# Patient Record
Sex: MALE | Race: BLACK OR AFRICAN AMERICAN | NOT HISPANIC OR LATINO | Employment: UNEMPLOYED | RURAL
[De-identification: names, ages, dates, MRNs, and addresses within clinical notes are randomized per-mention and may not be internally consistent; named-entity substitution may affect disease eponyms.]

---

## 2021-09-19 ENCOUNTER — HOSPITAL ENCOUNTER (EMERGENCY)
Facility: HOSPITAL | Age: 51
Discharge: HOME | End: 2021-09-19
Attending: SURGERY
Payer: OTHER GOVERNMENT

## 2021-09-19 VITALS
BODY MASS INDEX: 33.28 KG/M2 | TEMPERATURE: 100 F | WEIGHT: 224.69 LBS | RESPIRATION RATE: 20 BRPM | HEART RATE: 66 BPM | HEIGHT: 69 IN | SYSTOLIC BLOOD PRESSURE: 181 MMHG | OXYGEN SATURATION: 97 % | DIASTOLIC BLOOD PRESSURE: 95 MMHG

## 2021-09-19 DIAGNOSIS — J02.0 STREP PHARYNGITIS: Primary | ICD-10-CM

## 2021-09-19 DIAGNOSIS — U07.1 COVID-19: ICD-10-CM

## 2021-09-19 LAB
FLUAV AG UPPER RESP QL IA.RAPID: NEGATIVE
FLUBV AG UPPER RESP QL IA.RAPID: NEGATIVE
RAPID GROUP A STREP: POSITIVE
SARS-COV+SARS-COV-2 AG RESP QL IA.RAPID: POSITIVE

## 2021-09-19 PROCEDURE — 63600175 PHARM REV CODE 636 W HCPCS: Performed by: SURGERY

## 2021-09-19 PROCEDURE — 96365 THER/PROPH/DIAG IV INF INIT: CPT

## 2021-09-19 PROCEDURE — 99283 EMERGENCY DEPT VISIT LOW MDM: CPT | Mod: ,,, | Performed by: SURGERY

## 2021-09-19 PROCEDURE — 87428 SARSCOV & INF VIR A&B AG IA: CPT | Performed by: SURGERY

## 2021-09-19 PROCEDURE — 87880 STREP A ASSAY W/OPTIC: CPT | Performed by: SURGERY

## 2021-09-19 PROCEDURE — 99284 EMERGENCY DEPT VISIT MOD MDM: CPT | Mod: 25

## 2021-09-19 PROCEDURE — 25000003 PHARM REV CODE 250: Performed by: SURGERY

## 2021-09-19 PROCEDURE — M0243 CASIRIVI AND IMDEVI INFUSION: HCPCS | Performed by: SURGERY

## 2021-09-19 PROCEDURE — 99283 PR EMERGENCY DEPT VISIT,LEVEL III: ICD-10-PCS | Mod: ,,, | Performed by: SURGERY

## 2021-09-19 RX ORDER — ONDANSETRON 4 MG/1
4 TABLET, ORALLY DISINTEGRATING ORAL ONCE AS NEEDED
Status: DISCONTINUED | OUTPATIENT
Start: 2021-09-19 | End: 2021-09-19 | Stop reason: HOSPADM

## 2021-09-19 RX ORDER — DIPHENHYDRAMINE HYDROCHLORIDE 50 MG/ML
25 INJECTION INTRAMUSCULAR; INTRAVENOUS ONCE AS NEEDED
Status: DISCONTINUED | OUTPATIENT
Start: 2021-09-19 | End: 2021-09-19 | Stop reason: HOSPADM

## 2021-09-19 RX ORDER — CLONIDINE HYDROCHLORIDE 0.1 MG/1
0.2 TABLET ORAL
Status: COMPLETED | OUTPATIENT
Start: 2021-09-19 | End: 2021-09-19

## 2021-09-19 RX ORDER — SODIUM CHLORIDE 0.9 % (FLUSH) 0.9 %
10 SYRINGE (ML) INJECTION
Status: DISCONTINUED | OUTPATIENT
Start: 2021-09-19 | End: 2021-09-19 | Stop reason: HOSPADM

## 2021-09-19 RX ORDER — ALBUTEROL SULFATE 90 UG/1
2 AEROSOL, METERED RESPIRATORY (INHALATION)
Status: DISCONTINUED | OUTPATIENT
Start: 2021-09-19 | End: 2021-09-19 | Stop reason: HOSPADM

## 2021-09-19 RX ORDER — ACETAMINOPHEN 325 MG/1
650 TABLET ORAL ONCE AS NEEDED
Status: DISCONTINUED | OUTPATIENT
Start: 2021-09-19 | End: 2021-09-19 | Stop reason: HOSPADM

## 2021-09-19 RX ORDER — AZITHROMYCIN 250 MG/1
250 TABLET, FILM COATED ORAL DAILY
Qty: 6 TABLET | Refills: 0 | Status: SHIPPED | OUTPATIENT
Start: 2021-09-19 | End: 2023-02-02

## 2021-09-19 RX ORDER — EPINEPHRINE 0.3 MG/.3ML
0.3 INJECTION SUBCUTANEOUS
Status: DISCONTINUED | OUTPATIENT
Start: 2021-09-19 | End: 2021-09-19 | Stop reason: HOSPADM

## 2021-09-19 RX ADMIN — DEXTROSE MONOHYDRATE 500 MG: 50 INJECTION, SOLUTION INTRAVENOUS at 09:09

## 2021-09-19 RX ADMIN — CASIRIVIMAB AND IMDEVIMAB 600 MG: 600; 600 INJECTION, SOLUTION, CONCENTRATE INTRAVENOUS at 09:09

## 2021-09-19 RX ADMIN — CLONIDINE HYDROCHLORIDE 0.2 MG: 0.1 TABLET ORAL at 07:09

## 2021-09-21 ENCOUNTER — TELEPHONE (OUTPATIENT)
Dept: EMERGENCY MEDICINE | Facility: HOSPITAL | Age: 51
End: 2021-09-21

## 2021-11-16 ENCOUNTER — OFFICE VISIT (OUTPATIENT)
Dept: PRIMARY CARE CLINIC | Facility: CLINIC | Age: 51
End: 2021-11-16
Payer: COMMERCIAL

## 2021-11-16 VITALS
HEART RATE: 71 BPM | RESPIRATION RATE: 20 BRPM | SYSTOLIC BLOOD PRESSURE: 148 MMHG | OXYGEN SATURATION: 98 % | HEIGHT: 69 IN | TEMPERATURE: 98 F | WEIGHT: 236 LBS | BODY MASS INDEX: 34.96 KG/M2 | DIASTOLIC BLOOD PRESSURE: 90 MMHG

## 2021-11-16 DIAGNOSIS — Z12.5 ENCOUNTER FOR SCREENING FOR MALIGNANT NEOPLASM OF PROSTATE: ICD-10-CM

## 2021-11-16 DIAGNOSIS — R53.83 FATIGUE, UNSPECIFIED TYPE: ICD-10-CM

## 2021-11-16 DIAGNOSIS — E78.5 HYPERLIPIDEMIA, UNSPECIFIED HYPERLIPIDEMIA TYPE: ICD-10-CM

## 2021-11-16 DIAGNOSIS — R68.82 DECREASED LIBIDO: ICD-10-CM

## 2021-11-16 DIAGNOSIS — I10 HYPERTENSION, UNSPECIFIED TYPE: Primary | ICD-10-CM

## 2021-11-16 LAB
ALBUMIN SERPL BCP-MCNC: 3.4 G/DL (ref 3.5–5)
ALBUMIN/GLOB SERPL: 0.9 {RATIO}
ALP SERPL-CCNC: 58 U/L (ref 45–115)
ALT SERPL W P-5'-P-CCNC: 27 U/L (ref 16–61)
ANION GAP SERPL CALCULATED.3IONS-SCNC: 12 MMOL/L (ref 7–16)
AST SERPL W P-5'-P-CCNC: 16 U/L (ref 15–37)
BASOPHILS # BLD AUTO: 0.02 K/UL (ref 0–0.2)
BASOPHILS NFR BLD AUTO: 0.4 % (ref 0–1)
BILIRUB SERPL-MCNC: 0.2 MG/DL (ref 0–1.2)
BUN SERPL-MCNC: 22 MG/DL (ref 7–18)
BUN/CREAT SERPL: 17 (ref 6–20)
CALCIUM SERPL-MCNC: 9.5 MG/DL (ref 8.5–10.1)
CHLORIDE SERPL-SCNC: 109 MMOL/L (ref 98–107)
CHOLEST SERPL-MCNC: 195 MG/DL (ref 0–200)
CHOLEST/HDLC SERPL: 5.7 {RATIO}
CO2 SERPL-SCNC: 25 MMOL/L (ref 21–32)
CREAT SERPL-MCNC: 1.3 MG/DL (ref 0.7–1.3)
DIFFERENTIAL METHOD BLD: ABNORMAL
EOSINOPHIL # BLD AUTO: 0.1 K/UL (ref 0–0.5)
EOSINOPHIL NFR BLD AUTO: 2 % (ref 1–4)
ERYTHROCYTE [DISTWIDTH] IN BLOOD BY AUTOMATED COUNT: 12.2 % (ref 11.5–14.5)
GLOBULIN SER-MCNC: 3.8 G/DL (ref 2–4)
GLUCOSE SERPL-MCNC: 93 MG/DL (ref 74–106)
HCT VFR BLD AUTO: 40.5 % (ref 40–54)
HDLC SERPL-MCNC: 34 MG/DL (ref 40–60)
HGB BLD-MCNC: 13.4 G/DL (ref 13.5–18)
IMM GRANULOCYTES # BLD AUTO: 0.01 K/UL (ref 0–0.04)
IMM GRANULOCYTES NFR BLD: 0.2 % (ref 0–0.4)
LDLC SERPL CALC-MCNC: 135 MG/DL
LDLC/HDLC SERPL: 4 {RATIO}
LYMPHOCYTES # BLD AUTO: 1.54 K/UL (ref 1–4.8)
LYMPHOCYTES NFR BLD AUTO: 31.1 % (ref 27–41)
MCH RBC QN AUTO: 30.7 PG (ref 27–31)
MCHC RBC AUTO-ENTMCNC: 33.1 G/DL (ref 32–36)
MCV RBC AUTO: 92.7 FL (ref 80–96)
MONOCYTES # BLD AUTO: 0.42 K/UL (ref 0–0.8)
MONOCYTES NFR BLD AUTO: 8.5 % (ref 2–6)
MPC BLD CALC-MCNC: 9.2 FL (ref 9.4–12.4)
NEUTROPHILS # BLD AUTO: 2.86 K/UL (ref 1.8–7.7)
NEUTROPHILS NFR BLD AUTO: 57.8 % (ref 53–65)
NONHDLC SERPL-MCNC: 161 MG/DL
NRBC # BLD AUTO: 0 X10E3/UL
NRBC, AUTO (.00): 0 %
PLATELET # BLD AUTO: 314 K/UL (ref 150–400)
POTASSIUM SERPL-SCNC: 4 MMOL/L (ref 3.5–5.1)
PROT SERPL-MCNC: 7.2 G/DL (ref 6.4–8.2)
PSA SERPL-MCNC: 0.64 NG/ML (ref 0–3.1)
RBC # BLD AUTO: 4.37 M/UL (ref 4.6–6.2)
SODIUM SERPL-SCNC: 142 MMOL/L (ref 136–145)
TESTOST SERPL-MCNC: 291 NG/DL (ref 240–950)
TRIGL SERPL-MCNC: 128 MG/DL (ref 35–150)
VLDLC SERPL-MCNC: 26 MG/DL
WBC # BLD AUTO: 4.95 K/UL (ref 4.5–11)

## 2021-11-16 PROCEDURE — 84403 ASSAY OF TOTAL TESTOSTERONE: CPT | Mod: ,,, | Performed by: CLINICAL MEDICAL LABORATORY

## 2021-11-16 PROCEDURE — 85025 COMPLETE CBC W/AUTO DIFF WBC: CPT | Mod: ,,, | Performed by: CLINICAL MEDICAL LABORATORY

## 2021-11-16 PROCEDURE — 80061 LIPID PANEL: ICD-10-PCS | Mod: ,,, | Performed by: CLINICAL MEDICAL LABORATORY

## 2021-11-16 PROCEDURE — G0103 PSA, SCREENING: ICD-10-PCS | Mod: ,,, | Performed by: CLINICAL MEDICAL LABORATORY

## 2021-11-16 PROCEDURE — 99214 OFFICE O/P EST MOD 30 MIN: CPT | Mod: ,,, | Performed by: FAMILY MEDICINE

## 2021-11-16 PROCEDURE — 80061 LIPID PANEL: CPT | Mod: ,,, | Performed by: CLINICAL MEDICAL LABORATORY

## 2021-11-16 PROCEDURE — 99214 PR OFFICE/OUTPT VISIT, EST, LEVL IV, 30-39 MIN: ICD-10-PCS | Mod: ,,, | Performed by: FAMILY MEDICINE

## 2021-11-16 PROCEDURE — 85025 CBC WITH DIFFERENTIAL: ICD-10-PCS | Mod: ,,, | Performed by: CLINICAL MEDICAL LABORATORY

## 2021-11-16 PROCEDURE — 80053 COMPREHEN METABOLIC PANEL: CPT | Mod: ,,, | Performed by: CLINICAL MEDICAL LABORATORY

## 2021-11-16 PROCEDURE — G0103 PSA SCREENING: HCPCS | Mod: ,,, | Performed by: CLINICAL MEDICAL LABORATORY

## 2021-11-16 PROCEDURE — 80053 COMPREHENSIVE METABOLIC PANEL: ICD-10-PCS | Mod: ,,, | Performed by: CLINICAL MEDICAL LABORATORY

## 2021-11-16 PROCEDURE — 84403 TESTOSTERONE: ICD-10-PCS | Mod: ,,, | Performed by: CLINICAL MEDICAL LABORATORY

## 2021-11-16 RX ORDER — AMLODIPINE BESYLATE 10 MG/1
10 TABLET ORAL DAILY
COMMUNITY
End: 2021-11-16 | Stop reason: SDUPTHER

## 2021-11-16 RX ORDER — AMLODIPINE BESYLATE 10 MG/1
10 TABLET ORAL DAILY
Qty: 90 TABLET | Refills: 3 | Status: SHIPPED | OUTPATIENT
Start: 2021-11-16 | End: 2023-02-02 | Stop reason: SDUPTHER

## 2021-11-17 ENCOUNTER — TELEPHONE (OUTPATIENT)
Dept: PRIMARY CARE CLINIC | Facility: CLINIC | Age: 51
End: 2021-11-17
Payer: COMMERCIAL

## 2021-11-30 ENCOUNTER — OFFICE VISIT (OUTPATIENT)
Dept: PRIMARY CARE CLINIC | Facility: CLINIC | Age: 51
End: 2021-11-30
Payer: COMMERCIAL

## 2021-11-30 VITALS
BODY MASS INDEX: 34.51 KG/M2 | OXYGEN SATURATION: 98 % | WEIGHT: 233 LBS | TEMPERATURE: 98 F | DIASTOLIC BLOOD PRESSURE: 110 MMHG | HEIGHT: 69 IN | HEART RATE: 78 BPM | SYSTOLIC BLOOD PRESSURE: 160 MMHG

## 2021-11-30 DIAGNOSIS — I10 PRIMARY HYPERTENSION: Primary | ICD-10-CM

## 2021-11-30 PROCEDURE — 99213 OFFICE O/P EST LOW 20 MIN: CPT | Mod: ,,, | Performed by: FAMILY MEDICINE

## 2021-11-30 PROCEDURE — 99213 PR OFFICE/OUTPT VISIT, EST, LEVL III, 20-29 MIN: ICD-10-PCS | Mod: ,,, | Performed by: FAMILY MEDICINE

## 2021-11-30 RX ORDER — BENAZEPRIL HYDROCHLORIDE AND HYDROCHLOROTHIAZIDE 20; 12.5 MG/1; MG/1
1 TABLET ORAL DAILY
Qty: 90 TABLET | Refills: 3 | Status: SHIPPED | OUTPATIENT
Start: 2021-11-30 | End: 2023-02-02 | Stop reason: SDUPTHER

## 2022-04-25 ENCOUNTER — HOSPITAL ENCOUNTER (EMERGENCY)
Facility: HOSPITAL | Age: 52
Discharge: HOME | End: 2022-04-25
Attending: FAMILY MEDICINE
Payer: COMMERCIAL

## 2022-04-25 VITALS
HEART RATE: 84 BPM | DIASTOLIC BLOOD PRESSURE: 93 MMHG | OXYGEN SATURATION: 94 % | WEIGHT: 226.5 LBS | BODY MASS INDEX: 33.55 KG/M2 | SYSTOLIC BLOOD PRESSURE: 166 MMHG | HEIGHT: 69 IN | RESPIRATION RATE: 24 BRPM | TEMPERATURE: 102 F

## 2022-04-25 DIAGNOSIS — R50.9 FEVER: ICD-10-CM

## 2022-04-25 DIAGNOSIS — E78.5 HYPERLIPIDEMIA: ICD-10-CM

## 2022-04-25 DIAGNOSIS — J06.9 VIRAL URI WITH COUGH: Primary | ICD-10-CM

## 2022-04-25 DIAGNOSIS — R53.83 FATIGUE: ICD-10-CM

## 2022-04-25 DIAGNOSIS — Z12.5 ENCOUNTER FOR SCREENING FOR MALIGNANT NEOPLASM OF PROSTATE: ICD-10-CM

## 2022-04-25 DIAGNOSIS — J10.1 INFLUENZA A: ICD-10-CM

## 2022-04-25 DIAGNOSIS — R31.9 HEMATURIA, UNSPECIFIED TYPE: ICD-10-CM

## 2022-04-25 DIAGNOSIS — I10 PRIMARY HYPERTENSION: ICD-10-CM

## 2022-04-25 DIAGNOSIS — R68.82 DECREASED LIBIDO: ICD-10-CM

## 2022-04-25 DIAGNOSIS — R05.9 COUGH IN ADULT: ICD-10-CM

## 2022-04-25 LAB
ALBUMIN SERPL BCP-MCNC: 3.7 G/DL (ref 3.5–5)
ALBUMIN/GLOB SERPL: 0.9 {RATIO}
ALP SERPL-CCNC: 53 U/L (ref 45–115)
ALT SERPL W P-5'-P-CCNC: 30 U/L (ref 16–61)
ANION GAP SERPL CALCULATED.3IONS-SCNC: 9 MMOL/L (ref 7–16)
AST SERPL W P-5'-P-CCNC: 23 U/L (ref 15–37)
BACTERIA #/AREA URNS HPF: ABNORMAL /HPF
BASOPHILS # BLD AUTO: 0.01 K/UL (ref 0–0.2)
BASOPHILS NFR BLD AUTO: 0.2 % (ref 0–1)
BILIRUB SERPL-MCNC: 0.4 MG/DL (ref 0–1.2)
BILIRUB UR QL STRIP: NEGATIVE
BUN SERPL-MCNC: 15 MG/DL (ref 7–18)
BUN/CREAT SERPL: 9 (ref 6–20)
CALCIUM SERPL-MCNC: 9.5 MG/DL (ref 8.5–10.1)
CHLORIDE SERPL-SCNC: 106 MMOL/L (ref 98–107)
CLARITY UR: CLEAR
CO2 SERPL-SCNC: 27 MMOL/L (ref 21–32)
COLOR UR: YELLOW
CREAT SERPL-MCNC: 1.6 MG/DL (ref 0.7–1.3)
DIFFERENTIAL METHOD BLD: ABNORMAL
EOSINOPHIL # BLD AUTO: 0.05 K/UL (ref 0–0.5)
EOSINOPHIL NFR BLD AUTO: 1.1 % (ref 1–4)
ERYTHROCYTE [DISTWIDTH] IN BLOOD BY AUTOMATED COUNT: 12.4 % (ref 11.5–14.5)
FLUAV AG UPPER RESP QL IA.RAPID: POSITIVE
FLUBV AG UPPER RESP QL IA.RAPID: NEGATIVE
GLOBULIN SER-MCNC: 3.9 G/DL (ref 2–4)
GLUCOSE SERPL-MCNC: 110 MG/DL (ref 74–106)
GLUCOSE UR STRIP-MCNC: NEGATIVE MG/DL
HCT VFR BLD AUTO: 40.3 % (ref 40–54)
HGB BLD-MCNC: 13.2 G/DL (ref 13.5–18)
IMM GRANULOCYTES # BLD AUTO: 0 K/UL (ref 0–0.04)
IMM GRANULOCYTES NFR BLD: 0 % (ref 0–0.4)
KETONES UR STRIP-SCNC: NEGATIVE MG/DL
LACTATE SERPL-SCNC: 0.7 MMOL/L (ref 0.4–2)
LEUKOCYTE ESTERASE UR QL STRIP: NEGATIVE
LIPASE SERPL-CCNC: 60 U/L (ref 73–393)
LYMPHOCYTES # BLD AUTO: 0.46 K/UL (ref 1–4.8)
LYMPHOCYTES NFR BLD AUTO: 10.1 % (ref 27–41)
MCH RBC QN AUTO: 30.6 PG (ref 27–31)
MCHC RBC AUTO-ENTMCNC: 32.8 G/DL (ref 32–36)
MCV RBC AUTO: 93.5 FL (ref 80–96)
MONOCYTES # BLD AUTO: 0.32 K/UL (ref 0–0.8)
MONOCYTES NFR BLD AUTO: 7 % (ref 2–6)
MPC BLD CALC-MCNC: 8.7 FL (ref 9.4–12.4)
NEUTROPHILS # BLD AUTO: 3.73 K/UL (ref 1.8–7.7)
NEUTROPHILS NFR BLD AUTO: 81.6 % (ref 53–65)
NITRITE UR QL STRIP: NEGATIVE
PH UR STRIP: 6 PH UNITS
PLATELET # BLD AUTO: 211 K/UL (ref 150–400)
POTASSIUM SERPL-SCNC: 3.6 MMOL/L (ref 3.5–5.1)
PROT SERPL-MCNC: 7.6 G/DL (ref 6.4–8.2)
PROT UR QL STRIP: 30
RBC # BLD AUTO: 4.31 M/UL (ref 4.6–6.2)
RBC # UR STRIP: ABNORMAL /UL
RBC #/AREA URNS HPF: ABNORMAL /HPF
SARS-COV+SARS-COV-2 AG RESP QL IA.RAPID: NEGATIVE
SODIUM SERPL-SCNC: 138 MMOL/L (ref 136–145)
SP GR UR STRIP: 1.02
SQUAMOUS #/AREA URNS LPF: ABNORMAL /LPF
UROBILINOGEN UR STRIP-ACNC: 1 MG/DL
WBC # BLD AUTO: 4.57 K/UL (ref 4.5–11)
WBC #/AREA URNS HPF: ABNORMAL /HPF

## 2022-04-25 PROCEDURE — 80053 COMPREHEN METABOLIC PANEL: CPT | Performed by: FAMILY MEDICINE

## 2022-04-25 PROCEDURE — 83605 ASSAY OF LACTIC ACID: CPT | Performed by: FAMILY MEDICINE

## 2022-04-25 PROCEDURE — 83690 ASSAY OF LIPASE: CPT | Performed by: FAMILY MEDICINE

## 2022-04-25 PROCEDURE — 36415 COLL VENOUS BLD VENIPUNCTURE: CPT | Performed by: FAMILY MEDICINE

## 2022-04-25 PROCEDURE — 99284 EMERGENCY DEPT VISIT MOD MDM: CPT | Mod: 25 | Performed by: FAMILY MEDICINE

## 2022-04-25 PROCEDURE — 25000003 PHARM REV CODE 250: Performed by: FAMILY MEDICINE

## 2022-04-25 PROCEDURE — 85025 COMPLETE CBC W/AUTO DIFF WBC: CPT | Performed by: FAMILY MEDICINE

## 2022-04-25 PROCEDURE — 99283 PR EMERGENCY DEPT VISIT,LEVEL III: ICD-10-PCS | Mod: ,,, | Performed by: FAMILY MEDICINE

## 2022-04-25 PROCEDURE — 99283 EMERGENCY DEPT VISIT LOW MDM: CPT | Mod: ,,, | Performed by: FAMILY MEDICINE

## 2022-04-25 PROCEDURE — 81001 URINALYSIS AUTO W/SCOPE: CPT | Performed by: FAMILY MEDICINE

## 2022-04-25 PROCEDURE — 87428 SARSCOV & INF VIR A&B AG IA: CPT | Performed by: FAMILY MEDICINE

## 2022-04-25 RX ORDER — ACETAMINOPHEN 500 MG
1000 TABLET ORAL
Status: COMPLETED | OUTPATIENT
Start: 2022-04-25 | End: 2022-04-25

## 2022-04-25 RX ORDER — CLONIDINE HYDROCHLORIDE 0.1 MG/1
0.2 TABLET ORAL
Status: COMPLETED | OUTPATIENT
Start: 2022-04-25 | End: 2022-04-25

## 2022-04-25 RX ORDER — OSELTAMIVIR PHOSPHATE 75 MG/1
75 CAPSULE ORAL 2 TIMES DAILY
Qty: 10 CAPSULE | Refills: 0 | Status: SHIPPED | OUTPATIENT
Start: 2022-04-25 | End: 2022-04-30

## 2022-04-25 RX ADMIN — ACETAMINOPHEN 1000 MG: 500 TABLET, FILM COATED ORAL at 08:04

## 2022-04-25 RX ADMIN — CLONIDINE HYDROCHLORIDE 0.2 MG: 0.1 TABLET ORAL at 08:04

## 2022-04-25 NOTE — DISCHARGE INSTRUCTIONS
"Vitamin D3, 5000 IU capsules.  Take 3 with every meal for 3 days ONLY.  (Loading dose.)  Then decrease to 1 to 3 capsules total, ONE time daily, also with a meal, for maintenance.  Have your PCP check for your Vitamin D, 25-Hydroxy level on the blood test.  It needs to be between  for best health benefits, including resistance to all strains of cold and flu, including COVID.  If your blood level is below 60, take a little more, and if it is above 100, take a little less.    Vitamin C, 500mg tabs.  Take 1-2 tabs with every meal.  If this bothers your stomach, switch to "Kristi-C" which isn't as acidic.  Plenty of rest, fluids.  "

## 2022-04-25 NOTE — ED PROVIDER NOTES
Encounter Date: 4/25/2022       History     Chief Complaint   Patient presents with    Cough    Fever     Pt reports 2 days of cough, congestion and fever to 102 F.  Says he is drinking lots of water.          Review of patient's allergies indicates:  No Known Allergies  Past Medical History:   Diagnosis Date    Hypertension      History reviewed. No pertinent surgical history.  Family History   Problem Relation Age of Onset    Hypertension Mother     Diabetes Mother     Cancer Father      Social History     Tobacco Use    Smoking status: Never Smoker    Smokeless tobacco: Never Used   Substance Use Topics    Alcohol use: Yes     Comment: socially    Drug use: Never     Review of Systems   HENT: Positive for congestion and rhinorrhea.    Respiratory: Positive for cough. Negative for chest tightness, shortness of breath, wheezing and stridor.    Cardiovascular: Negative for chest pain, palpitations and leg swelling.   Gastrointestinal: Negative for abdominal pain, diarrhea, nausea and vomiting.   All other systems reviewed and are negative.      Physical Exam     Initial Vitals [04/25/22 0741]   BP Pulse Resp Temp SpO2   (!) 238/116 110 (!) 28 (!) 102.9 °F (39.4 °C) 97 %      MAP       --         Physical Exam    Nursing note and vitals reviewed.  Constitutional: He appears well-developed and well-nourished.   HENT:   Head: Normocephalic and atraumatic.   Eyes: EOM are normal. Pupils are equal, round, and reactive to light.   Neck: Neck supple. No tracheal deviation present. No JVD present.   Cardiovascular: Normal rate, regular rhythm, normal heart sounds and intact distal pulses. Exam reveals no gallop and no friction rub.    No murmur heard.  Pulmonary/Chest: Breath sounds normal. No stridor. No respiratory distress. He has no wheezes. He has no rhonchi. He has no rales.   Abdominal: Abdomen is soft. Bowel sounds are normal. He exhibits no distension. There is no abdominal tenderness. There is no rebound  and no guarding.   Musculoskeletal:         General: No tenderness or edema. Normal range of motion.      Cervical back: Neck supple.     Lymphadenopathy:     He has no cervical adenopathy.   Neurological: He is alert and oriented to person, place, and time.   Skin: Skin is warm and dry. Capillary refill takes less than 2 seconds.   Psychiatric: He has a normal mood and affect.         Medical Screening Exam   See Full Note    ED Course   Procedures  Labs Reviewed   COMPREHENSIVE METABOLIC PANEL - Abnormal; Notable for the following components:       Result Value    Glucose 110 (*)     Creatinine 1.60 (*)     eGFR 49 (*)     All other components within normal limits   LIPASE - Abnormal; Notable for the following components:    Lipase 60 (*)     All other components within normal limits   URINALYSIS, REFLEX TO URINE CULTURE - Abnormal; Notable for the following components:    Protein, UA 30  (*)     Blood, UA Moderate (*)     All other components within normal limits   CBC WITH DIFFERENTIAL - Abnormal; Notable for the following components:    RBC 4.31 (*)     Hemoglobin 13.2 (*)     MPV 8.7 (*)     Neutrophils % 81.6 (*)     Lymphocytes % 10.1 (*)     Lymphocytes, Absolute 0.46 (*)     Monocytes % 7.0 (*)     All other components within normal limits   SARS-COV2 (COVID) W/ FLU ANTIGEN - Abnormal; Notable for the following components:    Influenza A Positive (*)     All other components within normal limits    Narrative:     Negative SARS-CoV results should not be used as the sole basis for treatment or patient management decisions; negative results should be considered in the context of a patient's recent exposures, history and the presene of clinical signs and symptoms consistent with COVID-19.  Negative results should be treated as presumptive and confirmed by molecular assay, if necessary for patient management.   URINALYSIS, MICROSCOPIC - Abnormal; Notable for the following components:    RBC, UA 15-25 (*)      All other components within normal limits   LACTIC ACID, PLASMA - Normal   CBC W/ AUTO DIFFERENTIAL    Narrative:     The following orders were created for panel order CBC W/ AUTO DIFFERENTIAL.  Procedure                               Abnormality         Status                     ---------                               -----------         ------                     CBC with Differential[[id number]]        Abnormal            Final result                 Please view results for these tests on the individual orders.          Imaging Results          X-Ray Chest PA And Lateral (Final result)  Result time 04/25/22 08:17:43    Final result by Crystal Gong MD (04/25/22 08:17:43)                 Impression:      Minimal infiltrates/atelectasis in the left lung base      Electronically signed by: Crystal Gong  Date:    04/25/2022  Time:    08:17             Narrative:    EXAMINATION:  XR CHEST PA AND LATERAL    CLINICAL HISTORY:  Cough, unspecified    FINDINGS:  Comparison 07/28/2016    Mediastinal contours unchanged accounting for rotation    There is minimal linear opacity and vascular crowding in the retrocardiac left base.  Right lung is clear.                                 Medications   cloNIDine tablet 0.2 mg (0.2 mg Oral Given 4/25/22 0802)   acetaminophen tablet 1,000 mg (1,000 mg Oral Given 4/25/22 0801)                       Clinical Impression:   Final diagnoses:  [R50.9] Fever  [R05.9] Cough in adult  [J06.9] Viral URI with cough (Primary)  [J10.1] Influenza A  [R31.9] Hematuria, unspecified type          ED Disposition Condition    Discharge Stable        ED Prescriptions     Medication Sig Dispense Start Date End Date Auth. Provider    oseltamivir (TAMIFLU) 75 MG capsule Take 1 capsule (75 mg total) by mouth 2 (two) times daily. for 5 days 10 capsule 4/25/2022 4/30/2022 Maikol Munguia MD        Follow-up Information    None          Maikol Munguia MD  04/25/22 0805       Maikol Munguia MD  04/25/22  [id number]

## 2022-04-25 NOTE — Clinical Note
"Zach"Brodie Briscoe was seen and treated in our emergency department on 4/25/2022.  He may return to work on 04/28/2022.  MAY RETURN TO WORK IF FEVER FREE FOR 24 HOURS       If you have any questions or concerns, please don't hesitate to call.      DR. BAÑUELOS/ALICIA GRAFF RN    "

## 2022-04-25 NOTE — ED TRIAGE NOTES
Headache, cough, nasal congestion, and  Body aches that started yesterday. States coworkers were sick last week. Temp 102.9 pt hasnt taken any meds for fever. bp elevated. Pt states that he has not taken his bp meds this morning.

## 2022-04-26 ENCOUNTER — TELEPHONE (OUTPATIENT)
Dept: EMERGENCY MEDICINE | Facility: HOSPITAL | Age: 52
End: 2022-04-26
Payer: COMMERCIAL

## 2022-04-28 ENCOUNTER — OFFICE VISIT (OUTPATIENT)
Dept: PRIMARY CARE CLINIC | Facility: CLINIC | Age: 52
End: 2022-04-28
Payer: COMMERCIAL

## 2022-04-28 VITALS
BODY MASS INDEX: 32.29 KG/M2 | WEIGHT: 218 LBS | HEIGHT: 69 IN | TEMPERATURE: 98 F | SYSTOLIC BLOOD PRESSURE: 144 MMHG | OXYGEN SATURATION: 98 % | DIASTOLIC BLOOD PRESSURE: 90 MMHG | HEART RATE: 73 BPM

## 2022-04-28 DIAGNOSIS — H61.23 BILATERAL IMPACTED CERUMEN: ICD-10-CM

## 2022-04-28 DIAGNOSIS — R51.9 ACUTE NONINTRACTABLE HEADACHE, UNSPECIFIED HEADACHE TYPE: ICD-10-CM

## 2022-04-28 DIAGNOSIS — I10 PRIMARY HYPERTENSION: Primary | ICD-10-CM

## 2022-04-28 PROCEDURE — 96372 PR INJECTION,THERAP/PROPH/DIAG2ST, IM OR SUBCUT: ICD-10-PCS | Mod: 59,,, | Performed by: FAMILY MEDICINE

## 2022-04-28 PROCEDURE — 99213 OFFICE O/P EST LOW 20 MIN: CPT | Mod: 25,,, | Performed by: FAMILY MEDICINE

## 2022-04-28 PROCEDURE — 69209 REMOVE IMPACTED EAR WAX UNI: CPT | Mod: 50,,, | Performed by: FAMILY MEDICINE

## 2022-04-28 PROCEDURE — 69209 PR REMOVAL IMPACTED CERUMEN USING IRRIGATION/LAVAGE, UNILATERAL: ICD-10-PCS | Mod: 50,,, | Performed by: FAMILY MEDICINE

## 2022-04-28 PROCEDURE — 96372 THER/PROPH/DIAG INJ SC/IM: CPT | Mod: 59,,, | Performed by: FAMILY MEDICINE

## 2022-04-28 PROCEDURE — 99213 PR OFFICE/OUTPT VISIT, EST, LEVL III, 20-29 MIN: ICD-10-PCS | Mod: 25,,, | Performed by: FAMILY MEDICINE

## 2022-04-28 RX ORDER — BETAMETHASONE SODIUM PHOSPHATE AND BETAMETHASONE ACETATE 3; 3 MG/ML; MG/ML
6 INJECTION, SUSPENSION INTRA-ARTICULAR; INTRALESIONAL; INTRAMUSCULAR; SOFT TISSUE
Status: COMPLETED | OUTPATIENT
Start: 2022-04-28 | End: 2022-04-28

## 2022-04-28 RX ORDER — KETOROLAC TROMETHAMINE 30 MG/ML
60 INJECTION, SOLUTION INTRAMUSCULAR; INTRAVENOUS
Status: COMPLETED | OUTPATIENT
Start: 2022-04-28 | End: 2022-04-28

## 2022-04-28 RX ORDER — NEOMYCIN SULFATE, POLYMYXIN B SULFATE AND HYDROCORTISONE 10; 3.5; 1 MG/ML; MG/ML; [USP'U]/ML
3 SUSPENSION/ DROPS AURICULAR (OTIC) 3 TIMES DAILY
Qty: 10 ML | Refills: 0 | Status: SHIPPED | OUTPATIENT
Start: 2022-04-28 | End: 2023-02-02

## 2022-04-28 RX ADMIN — KETOROLAC TROMETHAMINE 60 MG: 30 INJECTION, SOLUTION INTRAMUSCULAR; INTRAVENOUS at 09:04

## 2022-04-28 RX ADMIN — BETAMETHASONE SODIUM PHOSPHATE AND BETAMETHASONE ACETATE 6 MG: 3; 3 INJECTION, SUSPENSION INTRA-ARTICULAR; INTRALESIONAL; INTRAMUSCULAR; SOFT TISSUE at 09:04

## 2022-04-28 NOTE — LETTER
April 28, 2022      [facility]  [address] [place] [address]  Phone: [phone]  Fax: [phone]       Patient: Zach Briscoe   YOB: 1970  Date of Visit: 04/28/2022    To Whom It May Concern:    Luis F Briscoe  was at [facility] on 04/28/2022. The patient may return to work/school on 05/02/2022 with no restrictions. If you have any questions or concerns, or if I can be of further assistance, please do not hesitate to contact me.    Sincerely,    Deb Scott LPN

## 2022-04-28 NOTE — PROGRESS NOTES
Subjective:       Patient ID: Zach Briscoe is a 51 y.o. male.    Chief Complaint: Headache (Bad headache ongoing since Sunday. Dx. With flu on 4/25 at [facility] ED)      Pt. Has a continuing headache since going to ED. Now he states that he's having numbness and tingling in left arm. He thinks he's had a TIA. He did not have a scan in ED. States that he is taking antihypertensive. He was diagnosed with influenza in the ED.    Headache   Associated symptoms include numbness. Pertinent negatives include no coughing, fever, nausea or vomiting.     Review of Systems   Constitutional: Negative for fatigue and fever.   HENT: Negative for dental problem.    Eyes: Negative for discharge.   Respiratory: Negative for cough, choking, chest tightness and shortness of breath.    Cardiovascular: Negative for chest pain and leg swelling.   Gastrointestinal: Negative for constipation, diarrhea, nausea and vomiting.   Genitourinary: Negative for discharge and flank pain.   Musculoskeletal: Negative for arthralgias and myalgias.   Allergic/Immunologic: Negative for environmental allergies.   Neurological: Positive for numbness and headaches. Negative for memory loss.   Psychiatric/Behavioral: Negative for behavioral problems and hallucinations.         Objective:      Physical Exam  Vitals and nursing note reviewed.   Constitutional:       Appearance: Normal appearance. He is normal weight.   HENT:      Head: Normocephalic and atraumatic.      Right Ear: Tympanic membrane normal. There is impacted cerumen.      Left Ear: Tympanic membrane normal. There is impacted cerumen.      Nose: Nose normal.      Mouth/Throat:      Mouth: Mucous membranes are moist.   Eyes:      Extraocular Movements: Extraocular movements intact.      Conjunctiva/sclera: Conjunctivae normal.      Pupils: Pupils are equal, round, and reactive to light.   Cardiovascular:      Rate and Rhythm: Normal rate and regular rhythm.      Pulses: Normal pulses.    Pulmonary:      Effort: Pulmonary effort is normal.      Breath sounds: Normal breath sounds.   Abdominal:      General: Abdomen is flat. Bowel sounds are normal.      Palpations: Abdomen is soft.   Musculoskeletal:         General: Normal range of motion.      Cervical back: Normal range of motion and neck supple.   Skin:     General: Skin is warm and dry.   Neurological:      General: No focal deficit present.      Mental Status: He is alert and oriented to person, place, and time.      Comments: Pt. Having a tingling of left arm. Motor function is intact.   Psychiatric:         Mood and Affect: Mood normal.         Assessment:       There are no diagnoses linked to this encounter.

## 2022-04-28 NOTE — PROGRESS NOTES
Bilateral ears irrigated with Elephant ear washer. Pt tolerated well. Bilateral ear cannals were red and  Dr ALICIA has been notified. Pt tolerated well. Performed by Deb Scott LPN

## 2022-05-06 ENCOUNTER — HOSPITAL ENCOUNTER (OUTPATIENT)
Dept: RADIOLOGY | Facility: HOSPITAL | Age: 52
Discharge: HOME | End: 2022-05-06
Attending: FAMILY MEDICINE
Payer: COMMERCIAL

## 2022-05-06 ENCOUNTER — TELEPHONE (OUTPATIENT)
Dept: PRIMARY CARE CLINIC | Facility: CLINIC | Age: 52
End: 2022-05-06
Payer: COMMERCIAL

## 2022-05-06 DIAGNOSIS — R51.9 ACUTE NONINTRACTABLE HEADACHE, UNSPECIFIED HEADACHE TYPE: ICD-10-CM

## 2022-05-06 PROCEDURE — 70450 CT HEAD/BRAIN W/O DYE: CPT | Mod: TC

## 2022-05-06 NOTE — TELEPHONE ENCOUNTER
Tried to call pt to let him know that Dr ALICIA did not report anything bad regarding his CT scan. No answer.

## 2022-05-06 NOTE — TELEPHONE ENCOUNTER
----- Message from Lizette Alvarenga MD sent at 5/6/2022 10:08 AM CDT -----  Please give pt. results

## 2023-02-02 ENCOUNTER — OFFICE VISIT (OUTPATIENT)
Dept: PRIMARY CARE CLINIC | Facility: CLINIC | Age: 53
End: 2023-02-02
Payer: COMMERCIAL

## 2023-02-02 VITALS
HEART RATE: 70 BPM | OXYGEN SATURATION: 99 % | DIASTOLIC BLOOD PRESSURE: 90 MMHG | TEMPERATURE: 98 F | HEIGHT: 69 IN | BODY MASS INDEX: 34.07 KG/M2 | WEIGHT: 230 LBS | SYSTOLIC BLOOD PRESSURE: 146 MMHG

## 2023-02-02 DIAGNOSIS — I10 HYPERTENSION, UNSPECIFIED TYPE: ICD-10-CM

## 2023-02-02 DIAGNOSIS — I10 PRIMARY HYPERTENSION: Primary | ICD-10-CM

## 2023-02-02 DIAGNOSIS — H61.23 BILATERAL IMPACTED CERUMEN: ICD-10-CM

## 2023-02-02 LAB
ALBUMIN SERPL BCP-MCNC: 3.8 G/DL (ref 3.5–5)
ALBUMIN/GLOB SERPL: 0.9 {RATIO}
ALP SERPL-CCNC: 52 U/L (ref 45–115)
ALT SERPL W P-5'-P-CCNC: 23 U/L (ref 16–61)
ANION GAP SERPL CALCULATED.3IONS-SCNC: 6 MMOL/L (ref 7–16)
AST SERPL W P-5'-P-CCNC: 14 U/L (ref 15–37)
BASOPHILS # BLD AUTO: 0.02 K/UL (ref 0–0.2)
BASOPHILS NFR BLD AUTO: 0.5 % (ref 0–1)
BILIRUB SERPL-MCNC: 0.4 MG/DL (ref ?–1.2)
BUN SERPL-MCNC: 13 MG/DL (ref 7–18)
BUN/CREAT SERPL: 10 (ref 6–20)
CALCIUM SERPL-MCNC: 10.2 MG/DL (ref 8.5–10.1)
CHLORIDE SERPL-SCNC: 112 MMOL/L (ref 98–107)
CHOLEST SERPL-MCNC: 208 MG/DL (ref 0–200)
CHOLEST/HDLC SERPL: 5.1 {RATIO}
CO2 SERPL-SCNC: 29 MMOL/L (ref 21–32)
CREAT SERPL-MCNC: 1.28 MG/DL (ref 0.7–1.3)
DIFFERENTIAL METHOD BLD: ABNORMAL
EGFR (NO RACE VARIABLE) (RUSH/TITUS): 68 ML/MIN/1.73M²
EOSINOPHIL # BLD AUTO: 0.11 K/UL (ref 0–0.5)
EOSINOPHIL NFR BLD AUTO: 2.6 % (ref 1–4)
ERYTHROCYTE [DISTWIDTH] IN BLOOD BY AUTOMATED COUNT: 12 % (ref 11.5–14.5)
GLOBULIN SER-MCNC: 4.2 G/DL (ref 2–4)
GLUCOSE SERPL-MCNC: 109 MG/DL (ref 74–106)
HCT VFR BLD AUTO: 43.4 % (ref 40–54)
HDLC SERPL-MCNC: 41 MG/DL (ref 40–60)
HGB BLD-MCNC: 13.9 G/DL (ref 13.5–18)
IMM GRANULOCYTES # BLD AUTO: 0.01 K/UL (ref 0–0.04)
IMM GRANULOCYTES NFR BLD: 0.2 % (ref 0–0.4)
LDLC SERPL CALC-MCNC: 144 MG/DL
LDLC/HDLC SERPL: 3.5 {RATIO}
LYMPHOCYTES # BLD AUTO: 1.05 K/UL (ref 1–4.8)
LYMPHOCYTES NFR BLD AUTO: 24.5 % (ref 27–41)
MCH RBC QN AUTO: 30 PG (ref 27–31)
MCHC RBC AUTO-ENTMCNC: 32 G/DL (ref 32–36)
MCV RBC AUTO: 93.5 FL (ref 80–96)
MONOCYTES # BLD AUTO: 0.32 K/UL (ref 0–0.8)
MONOCYTES NFR BLD AUTO: 7.5 % (ref 2–6)
MPC BLD CALC-MCNC: 9 FL (ref 9.4–12.4)
NEUTROPHILS # BLD AUTO: 2.77 K/UL (ref 1.8–7.7)
NEUTROPHILS NFR BLD AUTO: 64.7 % (ref 53–65)
NONHDLC SERPL-MCNC: 167 MG/DL
NRBC # BLD AUTO: 0 X10E3/UL
NRBC, AUTO (.00): 0 %
PLATELET # BLD AUTO: 286 K/UL (ref 150–400)
POTASSIUM SERPL-SCNC: 4 MMOL/L (ref 3.5–5.1)
PROT SERPL-MCNC: 8 G/DL (ref 6.4–8.2)
RBC # BLD AUTO: 4.64 M/UL (ref 4.6–6.2)
SODIUM SERPL-SCNC: 143 MMOL/L (ref 136–145)
TRIGL SERPL-MCNC: 117 MG/DL (ref 35–150)
VLDLC SERPL-MCNC: 23 MG/DL
WBC # BLD AUTO: 4.28 K/UL (ref 4.5–11)

## 2023-02-02 PROCEDURE — 3077F PR MOST RECENT SYSTOLIC BLOOD PRESSURE >= 140 MM HG: ICD-10-PCS | Mod: CPTII,,, | Performed by: FAMILY MEDICINE

## 2023-02-02 PROCEDURE — 1160F RVW MEDS BY RX/DR IN RCRD: CPT | Mod: CPTII,,, | Performed by: FAMILY MEDICINE

## 2023-02-02 PROCEDURE — 1159F PR MEDICATION LIST DOCUMENTED IN MEDICAL RECORD: ICD-10-PCS | Mod: CPTII,,, | Performed by: FAMILY MEDICINE

## 2023-02-02 PROCEDURE — 3008F BODY MASS INDEX DOCD: CPT | Mod: CPTII,,, | Performed by: FAMILY MEDICINE

## 2023-02-02 PROCEDURE — 1159F MED LIST DOCD IN RCRD: CPT | Mod: CPTII,,, | Performed by: FAMILY MEDICINE

## 2023-02-02 PROCEDURE — 99214 PR OFFICE/OUTPT VISIT, EST, LEVL IV, 30-39 MIN: ICD-10-PCS | Mod: 25,,, | Performed by: FAMILY MEDICINE

## 2023-02-02 PROCEDURE — 4010F PR ACE/ARB THEARPY RXD/TAKEN: ICD-10-PCS | Mod: CPTII,,, | Performed by: FAMILY MEDICINE

## 2023-02-02 PROCEDURE — 80053 COMPREHEN METABOLIC PANEL: CPT | Mod: ,,, | Performed by: CLINICAL MEDICAL LABORATORY

## 2023-02-02 PROCEDURE — 69209 REMOVE IMPACTED EAR WAX UNI: CPT | Mod: 50,,, | Performed by: FAMILY MEDICINE

## 2023-02-02 PROCEDURE — 1160F PR REVIEW ALL MEDS BY PRESCRIBER/CLIN PHARMACIST DOCUMENTED: ICD-10-PCS | Mod: CPTII,,, | Performed by: FAMILY MEDICINE

## 2023-02-02 PROCEDURE — 3008F PR BODY MASS INDEX (BMI) DOCUMENTED: ICD-10-PCS | Mod: CPTII,,, | Performed by: FAMILY MEDICINE

## 2023-02-02 PROCEDURE — 80053 COMPREHENSIVE METABOLIC PANEL: ICD-10-PCS | Mod: ,,, | Performed by: CLINICAL MEDICAL LABORATORY

## 2023-02-02 PROCEDURE — 69209 PR REMOVAL IMPACTED CERUMEN USING IRRIGATION/LAVAGE, UNILATERAL: ICD-10-PCS | Mod: 50,,, | Performed by: FAMILY MEDICINE

## 2023-02-02 PROCEDURE — 3077F SYST BP >= 140 MM HG: CPT | Mod: CPTII,,, | Performed by: FAMILY MEDICINE

## 2023-02-02 PROCEDURE — 80061 LIPID PANEL: ICD-10-PCS | Mod: ,,, | Performed by: CLINICAL MEDICAL LABORATORY

## 2023-02-02 PROCEDURE — 99214 OFFICE O/P EST MOD 30 MIN: CPT | Mod: 25,,, | Performed by: FAMILY MEDICINE

## 2023-02-02 PROCEDURE — 3080F DIAST BP >= 90 MM HG: CPT | Mod: CPTII,,, | Performed by: FAMILY MEDICINE

## 2023-02-02 PROCEDURE — 3080F PR MOST RECENT DIASTOLIC BLOOD PRESSURE >= 90 MM HG: ICD-10-PCS | Mod: CPTII,,, | Performed by: FAMILY MEDICINE

## 2023-02-02 PROCEDURE — 4010F ACE/ARB THERAPY RXD/TAKEN: CPT | Mod: CPTII,,, | Performed by: FAMILY MEDICINE

## 2023-02-02 PROCEDURE — 85025 COMPLETE CBC W/AUTO DIFF WBC: CPT | Mod: ,,, | Performed by: CLINICAL MEDICAL LABORATORY

## 2023-02-02 PROCEDURE — 80061 LIPID PANEL: CPT | Mod: ,,, | Performed by: CLINICAL MEDICAL LABORATORY

## 2023-02-02 PROCEDURE — 85025 CBC WITH DIFFERENTIAL: ICD-10-PCS | Mod: ,,, | Performed by: CLINICAL MEDICAL LABORATORY

## 2023-02-02 RX ORDER — AMLODIPINE BESYLATE 10 MG/1
10 TABLET ORAL DAILY
Qty: 90 TABLET | Refills: 3 | Status: SHIPPED | OUTPATIENT
Start: 2023-02-02 | End: 2024-01-29 | Stop reason: SDUPTHER

## 2023-02-02 RX ORDER — BENAZEPRIL/HYDROCHLOROTHIAZIDE 20 MG-25MG
1 TABLET ORAL DAILY
Qty: 90 TABLET | Refills: 3 | Status: SHIPPED | OUTPATIENT
Start: 2023-02-02 | End: 2024-01-29 | Stop reason: SDUPTHER

## 2023-02-02 RX ORDER — BENAZEPRIL HYDROCHLORIDE AND HYDROCHLOROTHIAZIDE 20; 12.5 MG/1; MG/1
1 TABLET ORAL DAILY
Qty: 90 TABLET | Refills: 3 | Status: SHIPPED | OUTPATIENT
Start: 2023-02-02 | End: 2023-02-02 | Stop reason: DRUGHIGH

## 2023-02-02 NOTE — PROGRESS NOTES
Subjective:       Patient ID: Zach Briscoe is a 52 y.o. male.    Chief Complaint: Hypertension (Check up and blood work.), Headache (Freq. headache), Blurred Vision (Off/on), Medication Refill, and Consult (Changing blood pressure medication.)    Has been taking BP meds. Dizziness with headaches    Hypertension  Associated symptoms include headaches. Pertinent negatives include no chest pain or shortness of breath.   Headache   Pertinent negatives include no coughing, fever, nausea or vomiting.   Medication Refill  Associated symptoms include headaches. Pertinent negatives include no arthralgias, chest pain, coughing, fatigue, fever, myalgias, nausea or vomiting.   Review of Systems   Constitutional:  Negative for fatigue and fever.   HENT:  Negative for dental problem.    Eyes:  Negative for discharge.   Respiratory:  Negative for cough, choking, chest tightness and shortness of breath.    Cardiovascular:  Negative for chest pain and leg swelling.   Gastrointestinal:  Negative for constipation, diarrhea, nausea and vomiting.   Genitourinary:  Negative for discharge and flank pain.   Musculoskeletal:  Negative for arthralgias and myalgias.   Allergic/Immunologic: Negative for environmental allergies.   Neurological:  Positive for headaches. Negative for memory loss.   Psychiatric/Behavioral:  Negative for behavioral problems and hallucinations.        Objective:      Physical Exam  Vitals and nursing note reviewed.   Constitutional:       Appearance: Normal appearance. He is normal weight.   HENT:      Head: Normocephalic and atraumatic.      Right Ear: Tympanic membrane normal. There is impacted cerumen.      Left Ear: Tympanic membrane normal. There is impacted cerumen.      Nose: Nose normal.      Mouth/Throat:      Mouth: Mucous membranes are moist.   Eyes:      Extraocular Movements: Extraocular movements intact.      Conjunctiva/sclera: Conjunctivae normal.      Pupils: Pupils are equal, round, and  reactive to light.   Cardiovascular:      Rate and Rhythm: Normal rate and regular rhythm.      Pulses: Normal pulses.   Pulmonary:      Effort: Pulmonary effort is normal.      Breath sounds: Normal breath sounds.   Abdominal:      General: Abdomen is flat. Bowel sounds are normal.      Palpations: Abdomen is soft.   Musculoskeletal:         General: Normal range of motion.      Cervical back: Normal range of motion and neck supple.   Skin:     General: Skin is warm and dry.   Neurological:      General: No focal deficit present.      Mental Status: He is alert and oriented to person, place, and time.   Psychiatric:         Mood and Affect: Mood normal.       Assessment:       Problem List Items Addressed This Visit          Cardiac/Vascular    Primary hypertension - Primary    Relevant Medications    amLODIPine (NORVASC) 10 MG tablet    benazepril-hydrochlorthiazide (LOTENSIN HCT) 20-25 mg Tab    Other Relevant Orders    CBC Auto Differential    Comprehensive Metabolic Panel    Lipid Panel     Other Visit Diagnoses       Hypertension, unspecified type        Relevant Medications    amLODIPine (NORVASC) 10 MG tablet              Plan:       Will call lab results  RTC as needed

## 2023-02-02 NOTE — PROGRESS NOTES
Bilateral ears irrigated with elephant ear washer until TM visible. Pt tolerated well. Performed by Deb Scott LPN

## 2023-02-09 ENCOUNTER — TELEPHONE (OUTPATIENT)
Dept: PRIMARY CARE CLINIC | Facility: CLINIC | Age: 53
End: 2023-02-09
Payer: COMMERCIAL

## 2023-02-09 NOTE — TELEPHONE ENCOUNTER
----- Message from Lizette Alvarenga MD sent at 2/3/2023  8:35 AM CST -----  Please give pt. results

## 2023-02-22 ENCOUNTER — HOSPITAL ENCOUNTER (EMERGENCY)
Facility: HOSPITAL | Age: 53
Discharge: HOME | End: 2023-02-22
Attending: EMERGENCY MEDICINE
Payer: COMMERCIAL

## 2023-02-22 ENCOUNTER — HOSPITAL ENCOUNTER (EMERGENCY)
Facility: HOSPITAL | Age: 53
Discharge: HOME | End: 2023-02-22
Attending: INTERNAL MEDICINE
Payer: COMMERCIAL

## 2023-02-22 ENCOUNTER — OFFICE VISIT (OUTPATIENT)
Dept: PRIMARY CARE CLINIC | Facility: CLINIC | Age: 53
End: 2023-02-22

## 2023-02-22 VITALS
HEIGHT: 69 IN | TEMPERATURE: 98 F | HEART RATE: 92 BPM | SYSTOLIC BLOOD PRESSURE: 146 MMHG | BODY MASS INDEX: 31.99 KG/M2 | DIASTOLIC BLOOD PRESSURE: 82 MMHG | OXYGEN SATURATION: 97 % | WEIGHT: 216 LBS

## 2023-02-22 VITALS
RESPIRATION RATE: 19 BRPM | OXYGEN SATURATION: 97 % | HEART RATE: 91 BPM | DIASTOLIC BLOOD PRESSURE: 95 MMHG | WEIGHT: 220 LBS | SYSTOLIC BLOOD PRESSURE: 185 MMHG | TEMPERATURE: 98 F | HEIGHT: 69 IN | BODY MASS INDEX: 32.58 KG/M2

## 2023-02-22 VITALS
DIASTOLIC BLOOD PRESSURE: 73 MMHG | WEIGHT: 216.5 LBS | HEART RATE: 81 BPM | TEMPERATURE: 99 F | RESPIRATION RATE: 16 BRPM | SYSTOLIC BLOOD PRESSURE: 130 MMHG | OXYGEN SATURATION: 98 % | BODY MASS INDEX: 31.97 KG/M2

## 2023-02-22 DIAGNOSIS — I16.0 MALIGNANT HYPERTENSIVE URGENCY: Primary | ICD-10-CM

## 2023-02-22 DIAGNOSIS — F41.9 ANXIETY: ICD-10-CM

## 2023-02-22 DIAGNOSIS — I10 HYPERTENSION: ICD-10-CM

## 2023-02-22 DIAGNOSIS — I10 PRIMARY HYPERTENSION: ICD-10-CM

## 2023-02-22 DIAGNOSIS — R20.0 NUMBNESS: Primary | ICD-10-CM

## 2023-02-22 DIAGNOSIS — I10 PRIMARY HYPERTENSION: Primary | ICD-10-CM

## 2023-02-22 DIAGNOSIS — R42 DIZZINESS: ICD-10-CM

## 2023-02-22 DIAGNOSIS — R51.9 CHRONIC NONINTRACTABLE HEADACHE, UNSPECIFIED HEADACHE TYPE: ICD-10-CM

## 2023-02-22 DIAGNOSIS — R51.9 FREQUENT HEADACHES: ICD-10-CM

## 2023-02-22 DIAGNOSIS — G89.29 CHRONIC NONINTRACTABLE HEADACHE, UNSPECIFIED HEADACHE TYPE: ICD-10-CM

## 2023-02-22 DIAGNOSIS — G43.109 MIGRAINE EQUIVALENT SYNDROME: ICD-10-CM

## 2023-02-22 LAB
AMPHET UR QL SCN: NEGATIVE
ANION GAP SERPL CALCULATED.3IONS-SCNC: 14 MMOL/L (ref 7–16)
BARBITURATES UR QL SCN: NEGATIVE
BASOPHILS # BLD AUTO: 0.01 K/UL (ref 0–0.2)
BASOPHILS NFR BLD AUTO: 0.2 % (ref 0–1)
BENZODIAZ METAB UR QL SCN: NEGATIVE
BILIRUB UR QL STRIP: NEGATIVE
BUN SERPL-MCNC: 21 MG/DL (ref 7–18)
BUN/CREAT SERPL: 15 (ref 6–20)
CALCIUM SERPL-MCNC: 10.5 MG/DL (ref 8.5–10.1)
CANNABINOIDS UR QL SCN: NEGATIVE
CHLORIDE SERPL-SCNC: 106 MMOL/L (ref 98–107)
CLARITY UR: CLEAR
CO2 SERPL-SCNC: 28 MMOL/L (ref 21–32)
COCAINE UR QL SCN: NEGATIVE
COLOR UR: YELLOW
CREAT SERPL-MCNC: 1.36 MG/DL (ref 0.7–1.3)
DIFFERENTIAL METHOD BLD: ABNORMAL
EGFR (NO RACE VARIABLE) (RUSH/TITUS): 63 ML/MIN/1.73M²
EOSINOPHIL # BLD AUTO: 0.13 K/UL (ref 0–0.5)
EOSINOPHIL NFR BLD AUTO: 2.6 % (ref 1–4)
ERYTHROCYTE [DISTWIDTH] IN BLOOD BY AUTOMATED COUNT: 12.4 % (ref 11.5–14.5)
GLUCOSE SERPL-MCNC: 103 MG/DL (ref 74–106)
GLUCOSE UR STRIP-MCNC: NEGATIVE MG/DL
HCT VFR BLD AUTO: 42.4 % (ref 40–54)
HGB BLD-MCNC: 14.3 G/DL (ref 13.5–18)
IMM GRANULOCYTES # BLD AUTO: 0.01 K/UL (ref 0–0.04)
IMM GRANULOCYTES NFR BLD: 0.2 % (ref 0–0.4)
KETONES UR STRIP-SCNC: 40 MG/DL
LEUKOCYTE ESTERASE UR QL STRIP: NEGATIVE
LYMPHOCYTES # BLD AUTO: 1.46 K/UL (ref 1–4.8)
LYMPHOCYTES NFR BLD AUTO: 29.4 % (ref 27–41)
MCH RBC QN AUTO: 30.4 PG (ref 27–31)
MCHC RBC AUTO-ENTMCNC: 33.7 G/DL (ref 32–36)
MCV RBC AUTO: 90 FL (ref 80–96)
MONOCYTES # BLD AUTO: 0.43 K/UL (ref 0–0.8)
MONOCYTES NFR BLD AUTO: 8.7 % (ref 2–6)
MPC BLD CALC-MCNC: 8.3 FL (ref 9.4–12.4)
NEUTROPHILS # BLD AUTO: 2.93 K/UL (ref 1.8–7.7)
NEUTROPHILS NFR BLD AUTO: 58.9 % (ref 53–65)
NITRITE UR QL STRIP: NEGATIVE
NT-PROBNP SERPL-MCNC: 36 PG/ML (ref 1–125)
OPIATES UR QL SCN: NEGATIVE
PCP UR QL SCN: NEGATIVE
PH UR STRIP: 5 PH UNITS
PLATELET # BLD AUTO: 282 K/UL (ref 150–400)
POTASSIUM SERPL-SCNC: 3.7 MMOL/L (ref 3.5–5.1)
PROT UR QL STRIP: ABNORMAL
RBC # BLD AUTO: 4.71 M/UL (ref 4.6–6.2)
RBC # UR STRIP: NEGATIVE /UL
SODIUM SERPL-SCNC: 144 MMOL/L (ref 136–145)
SP GR UR STRIP: >=1.03
TROPONIN I SERPL HS-MCNC: 11 PG/ML
UROBILINOGEN UR STRIP-ACNC: 0.2 MG/DL
WBC # BLD AUTO: 4.97 K/UL (ref 4.5–11)

## 2023-02-22 PROCEDURE — 85025 COMPLETE CBC W/AUTO DIFF WBC: CPT | Performed by: INTERNAL MEDICINE

## 2023-02-22 PROCEDURE — 99212 OFFICE O/P EST SF 10 MIN: CPT | Mod: ,,, | Performed by: FAMILY MEDICINE

## 2023-02-22 PROCEDURE — 99285 EMERGENCY DEPT VISIT HI MDM: CPT | Mod: 25

## 2023-02-22 PROCEDURE — 93005 ELECTROCARDIOGRAM TRACING: CPT

## 2023-02-22 PROCEDURE — 99284 EMERGENCY DEPT VISIT MOD MDM: CPT | Mod: ,,, | Performed by: INTERNAL MEDICINE

## 2023-02-22 PROCEDURE — 81003 URINALYSIS AUTO W/O SCOPE: CPT | Mod: 59 | Performed by: INTERNAL MEDICINE

## 2023-02-22 PROCEDURE — 99282 EMERGENCY DEPT VISIT SF MDM: CPT | Mod: 25

## 2023-02-22 PROCEDURE — 84484 ASSAY OF TROPONIN QUANT: CPT | Performed by: INTERNAL MEDICINE

## 2023-02-22 PROCEDURE — 99284 PR EMERGENCY DEPT VISIT,LEVEL IV: ICD-10-PCS | Mod: ,,, | Performed by: INTERNAL MEDICINE

## 2023-02-22 PROCEDURE — 80048 BASIC METABOLIC PNL TOTAL CA: CPT | Performed by: INTERNAL MEDICINE

## 2023-02-22 PROCEDURE — 63600175 PHARM REV CODE 636 W HCPCS: Performed by: INTERNAL MEDICINE

## 2023-02-22 PROCEDURE — 99283 EMERGENCY DEPT VISIT LOW MDM: CPT | Mod: ,,, | Performed by: EMERGENCY MEDICINE

## 2023-02-22 PROCEDURE — 93010 ELECTROCARDIOGRAM REPORT: CPT | Mod: ,,, | Performed by: INTERNAL MEDICINE

## 2023-02-22 PROCEDURE — 99283 PR EMERGENCY DEPT VISIT,LEVEL III: ICD-10-PCS | Mod: ,,, | Performed by: EMERGENCY MEDICINE

## 2023-02-22 PROCEDURE — 96374 THER/PROPH/DIAG INJ IV PUSH: CPT

## 2023-02-22 PROCEDURE — 83880 ASSAY OF NATRIURETIC PEPTIDE: CPT | Performed by: INTERNAL MEDICINE

## 2023-02-22 PROCEDURE — 80307 DRUG TEST PRSMV CHEM ANLYZR: CPT | Performed by: INTERNAL MEDICINE

## 2023-02-22 PROCEDURE — 25000003 PHARM REV CODE 250: Performed by: INTERNAL MEDICINE

## 2023-02-22 PROCEDURE — 93010 EKG 12-LEAD: ICD-10-PCS | Mod: ,,, | Performed by: INTERNAL MEDICINE

## 2023-02-22 PROCEDURE — 99212 PR OFFICE/OUTPT VISIT, EST, LEVL II, 10-19 MIN: ICD-10-PCS | Mod: ,,, | Performed by: FAMILY MEDICINE

## 2023-02-22 RX ORDER — ACETAMINOPHEN 500 MG
1000 TABLET ORAL
Status: COMPLETED | OUTPATIENT
Start: 2023-02-22 | End: 2023-02-22

## 2023-02-22 RX ORDER — HYDRALAZINE HYDROCHLORIDE 20 MG/ML
10 INJECTION INTRAMUSCULAR; INTRAVENOUS
Status: COMPLETED | OUTPATIENT
Start: 2023-02-22 | End: 2023-02-22

## 2023-02-22 RX ADMIN — HYDRALAZINE HYDROCHLORIDE 10 MG: 20 INJECTION INTRAMUSCULAR; INTRAVENOUS at 12:02

## 2023-02-22 RX ADMIN — ACETAMINOPHEN 1000 MG: 500 TABLET, FILM COATED ORAL at 01:02

## 2023-02-22 NOTE — ED TRIAGE NOTES
Pt back to ER with numbness to right side. Pt was having numbness to right side yesterday and was seen in this ER and saw local doctor today. Pt states one hour ago he started having right side numbness and could not walk. Pt states some tingling to right leg. Pt states he has been having headaches for 1 week and vision has become blurred pt has been referred to a specialist after seeing his eye doctor. Pt denies pain  pt denies numbness to right side upon arrival.

## 2023-02-22 NOTE — ED PROVIDER NOTES
Encounter Date: 2/22/2023       History     Chief Complaint   Patient presents with    Headache    Blurred Vision    Hypertension     Stared around 4PM yesterday     Patient comes in by ambulance complaining of headache and dizziness today.  The patient drove himself to the EMS station or 15 miles.  Said it been going on off and on today.  Patient complaining some blurred vision but no ataxia, chest pain, shortness breath, paralysis paresthesias foot drop incontinence urine or bowel loss of consciousness seizure activity.      The patient's has a history of old chart her headaches and dizziness since 2021.  His blood pressure been uncontrolled according to the records over 200 multiple admissions both in the emergency room and in his primary care provider's office.  Patient said he is never been seen by a cardiologist at all.  He was seen a proximally 2 weeks ago by his primary care provider and blood pressure medicine was changed crutches pressure was over 200.  At that time was complaining about headache and dizziness.  Had a CT scan done last year for the same complaints and problems of headache dizziness.      Review of patient's allergies indicates:  No Known Allergies  Past Medical History:   Diagnosis Date    Hypertension      History reviewed. No pertinent surgical history.  Family History   Problem Relation Age of Onset    Hypertension Mother     Diabetes Mother     Cancer Father      Social History     Tobacco Use    Smoking status: Never    Smokeless tobacco: Never   Substance Use Topics    Alcohol use: Yes     Comment: socially    Drug use: Never     Review of Systems   Constitutional:  Negative for fever.   HENT:  Negative for sore throat.    Respiratory:  Negative for shortness of breath.    Cardiovascular:  Negative for chest pain.   Gastrointestinal:  Negative for nausea.   Genitourinary:  Negative for dysuria.   Musculoskeletal:  Negative for back pain.   Skin:  Negative for rash.   Neurological:   Negative for weakness.   Hematological:  Does not bruise/bleed easily.     Physical Exam     Initial Vitals [02/22/23 0035]   BP Pulse Resp Temp SpO2   (!) 208/106 90 18 98.2 °F (36.8 °C) 98 %      MAP       --         Physical Exam    Nursing note and vitals reviewed.  Constitutional: He appears well-developed.   HENT:   Head: Normocephalic.   Eyes: Pupils are equal, round, and reactive to light.   Neck:   Normal range of motion.  Cardiovascular:  Normal rate, regular rhythm, normal heart sounds and normal pulses.           Pulmonary/Chest: Effort normal and breath sounds normal.   Abdominal: Abdomen is soft.   Musculoskeletal:         General: Normal range of motion.      Cervical back: Normal range of motion.     Neurological: He is alert. He has normal strength and normal reflexes. No cranial nerve deficit or sensory deficit. GCS eye subscore is 4. GCS verbal subscore is 5. GCS motor subscore is 6.   Skin: Skin is warm.       Medical Screening Exam   See Full Note    ED Course   Procedures  Labs Reviewed   BASIC METABOLIC PANEL - Abnormal; Notable for the following components:       Result Value    BUN 21 (*)     Creatinine 1.36 (*)     Calcium 10.5 (*)     All other components within normal limits   URINALYSIS, REFLEX TO URINE CULTURE - Abnormal; Notable for the following components:    Protein, UA Trace (*)     Ketones, UA 40 (*)     Specific Gravity, UA >=1.030 (*)     All other components within normal limits   CBC WITH DIFFERENTIAL - Abnormal; Notable for the following components:    MPV 8.3 (*)     Monocytes % 8.7 (*)     All other components within normal limits   NT-PRO NATRIURETIC PEPTIDE - Normal   DRUG SCREEN, URINE (BEAKER) - Normal    Narrative:     The results of screening tests should be considered presumptive. Confirmatory testing is available upon request.    Cutoff Points:  PCP:         25ng/mL  AMPH:        500ng/mL  RANDELL:        200ng/mL  DARRIN:        200ng/mL  THC:         50ng/mL  JESICA:          300ng/mL  OPI:         2000ng/mL   TROPONIN I - Normal   CBC W/ AUTO DIFFERENTIAL    Narrative:     The following orders were created for panel order CBC auto differential.  Procedure                               Abnormality         Status                     ---------                               -----------         ------                     CBC with Differential[[id number]]        Abnormal            Final result                 Please view results for these tests on the individual orders.     EKG Readings: (Independently Interpreted)   Initial Reading: No STEMI. Rhythm: Normal Sinus Rhythm. Heart Rate: 80. Ectopy: No Ectopy. Conduction: RBBB. ST Segments: Non-Specific ST Segment Depression. Axis: Right Axis Deviation. Clinical Impression: Normal Sinus Rhythm with RBBB   Normal sinus rhythm rate of 80, LVH with a right bundle-branch block pattern.  There is no old EKG for comparison.   ECG Results              EKG 12-lead (In process)  Result time 02/22/23 00:48:05      In process by Interface, Lab In [facility] (02/22/23 00:48:05)                   Narrative:    Test Reason : I10,    Vent. Rate : 080 BPM     Atrial Rate : 080 BPM     P-R Int : 146 ms          QRS Dur : 146 ms      QT Int : 406 ms       P-R-T Axes : 070 270 018 degrees     QTc Int : 468 ms    Normal sinus rhythm  Right bundle branch block  Abnormal ECG  No previous ECGs available    Referred By: SHASHANK QUINTERO           Confirmed By:                                   Imaging Results              X-Ray Chest AP Portable (In process)                      CT Head Without Contrast (In process)                   X-Rays:   Independently Interpreted Readings:   Other Readings:  Brain shows no acute findings, chest x-ray shows no acute findings  Medications   acetaminophen tablet 1,000 mg (has no administration in time range)   hydrALAZINE injection 10 mg (10 mg Intravenous Given 2/22/23 0053)     Medical Decision Making:   History:   Old Medical  Records: I decided to obtain old medical records.  Old Records Summarized: records from clinic visits and records from previous admission(s).       <> Summary of Records: Reviewed the patient's old records shows his pressures been uncontrolled for the last 2 years.  He has been in the emergency room for headaches and dizziness and his primary care provider's office.  He has had a CT scan done in the past that was negative.  He is had no follow with Neurology or Cardiology.  Initial Assessment:   Patient recurrent headache and dizziness secondary to uncontrolled high blood pressure without any chest pain shortness breath or focal neurologic deficits except blurred vision.  Differential Diagnosis:   Rule out stroke, rule out CNS bleed, rule out cardiac cardiac ischemia  Clinical Tests:   Lab Tests: Ordered and Reviewed  The following lab test(s) were unremarkable: CBC, CMP, Troponin, BNP, Urinalysis and UPT       <> Summary of Lab: Patient's labs accept mild elevated creatinine is all within normal limits  Radiological Study: Ordered and Reviewed  Medical Tests: Ordered and Reviewed  ED Management:  Patient with uncontrolled hypertension and history of noncompliance and follow-up.  For pressure been uncontrolled for over 2 years.  No history any strokes, no lab tests CT scans suggest any stroke-like symptoms.  Patient blood pressure is better, will follow up with his primary care provider today for rechecked.  Will also refer patient to cardiology for control of his blood pressure.           ED Course as of 02/22/23 0156   Wed Feb 22, 2023   0109 Troponin I High Sensitivity: 11.0 [PW]   0111 NT-proBNP: 36 [PW]   0111 CBC auto differential(!) [PW]   0111 Basic metabolic panel(!) [PW]   0122 Urinalysis, Reflex to Urine Culture Urine, Clean Catch(!) [PW]   0137 Drug Screen, Urine [PW]      ED Course User Index  [PW] Rufino Haider MD          Clinical Impression:   Final diagnoses:  [I10] Hypertension  [I16.0] Malignant  hypertensive urgency (Primary)  [R42] Dizziness  [R51.9, G89.29] Chronic nonintractable headache, unspecified headache type  [I10] Primary hypertension        ED Disposition Condition    Discharge Stable          ED Prescriptions    None       Follow-up Information       Follow up With Specialties Details Why Contact Info    Lizette Alvarenga MD [facility]  [address] [facility] [address]  [phone]               Rufino Haider MD  02/22/23 0156

## 2023-02-22 NOTE — LETTER
February 22, 2023      [facility]  [address]   [place] [address]  Phone: [phone]  Fax: [phone]       Patient: Zach Briscoe   YOB: 1970  Date of Visit: 02/22/2023    To Whom It May Concern:    Luis F Briscoe  was at [facility] on 02/22/2023. The patient may return to work/school on 02- with no restrictions. If you have any questions or concerns, or if I can be of further assistance, please do not hesitate to contact me.    Sincerely,    Madai De La Rosa LPN

## 2023-02-22 NOTE — ED TRIAGE NOTES
Pt arrived via [facility] with c/o HA, blurred vision and hypertension. Pt states he has taken his prescribed bp meds as ordered.

## 2023-02-22 NOTE — PROGRESS NOTES
Subjective:       Patient ID: Zach Briscoe is a 52 y.o. male.    Chief Complaint: Hypertension (Follow up visit from [facility] due to elevated blood pressure with blurr vision.)    Pt. Having episodes of blurred vision, headaches and right sided motor weakness. Causing much anxiety. Testing so far has not been definitive. A repeat BP here was 136/80. He is willing to see a cardiologist and a neurologist to further evaluate his symptoms.    Hypertension  Associated symptoms include headaches. Pertinent negatives include no chest pain or shortness of breath.   Review of Systems   Constitutional:  Negative for fatigue and fever.   HENT:  Negative for nasal congestion and dental problem.    Eyes:  Negative for discharge.   Respiratory:  Negative for cough, choking, chest tightness and shortness of breath.    Cardiovascular:  Negative for chest pain and leg swelling.   Gastrointestinal:  Negative for constipation, diarrhea, nausea and vomiting.   Genitourinary:  Negative for bladder incontinence, difficulty urinating, discharge and flank pain.   Musculoskeletal:  Negative for arthralgias and myalgias.   Allergic/Immunologic: Negative for environmental allergies.   Neurological:  Positive for headaches. Negative for memory loss.   Psychiatric/Behavioral:  Negative for behavioral problems, confusion and hallucinations. The patient is nervous/anxious.        Objective:      Physical Exam  Vitals and nursing note reviewed.   Constitutional:       Appearance: Normal appearance. He is normal weight.   HENT:      Head: Normocephalic and atraumatic.      Right Ear: Tympanic membrane normal.      Left Ear: Tympanic membrane normal.      Nose: Nose normal.      Mouth/Throat:      Mouth: Mucous membranes are moist.   Eyes:      Extraocular Movements: Extraocular movements intact.      Conjunctiva/sclera: Conjunctivae normal.      Pupils: Pupils are equal, round, and reactive to light.   Cardiovascular:      Rate and Rhythm:  Normal rate and regular rhythm.      Pulses: Normal pulses.   Pulmonary:      Effort: Pulmonary effort is normal.      Breath sounds: Normal breath sounds.   Abdominal:      General: Abdomen is flat. Bowel sounds are normal.      Palpations: Abdomen is soft.   Musculoskeletal:         General: Normal range of motion.      Cervical back: Normal range of motion and neck supple.   Skin:     General: Skin is warm and dry.   Neurological:      General: No focal deficit present.      Mental Status: He is alert and oriented to person, place, and time.   Psychiatric:         Mood and Affect: Mood normal.       Assessment:       Problem List Items Addressed This Visit          Neuro    Frequent headaches    Relevant Orders    Ambulatory referral/consult to Neurology       Psychiatric    Anxiety       Cardiac/Vascular    Primary hypertension - Primary    Relevant Orders    Ambulatory referral/consult to Cardiology       Plan:       Continue meds for now  RTC as needed

## 2023-02-23 NOTE — ED PROVIDER NOTES
Encounter Date: 2/22/2023       History     Chief Complaint   Patient presents with    Numbness     Right side     Patient presents with recurrent right-sided numbness, intermittent.  No motor deficit reported.  He does state that when he does get the right-sided numbness he finds it hard to move his right arm and leg.  Patient was seen here in this emergency department for same symptoms less than 24 hours ago and had a full workup.  Saw Dr. Solano in the office today, his wife thinks that they are trying to set up an appointment for neurology follow-up.  Patient has a longstanding history of uncontrolled hypertension.    Review of patient's allergies indicates:  No Known Allergies  Past Medical History:   Diagnosis Date    Hypertension      History reviewed. No pertinent surgical history.  Family History   Problem Relation Age of Onset    Hypertension Mother     Diabetes Mother     Cancer Father      Social History     Tobacco Use    Smoking status: Never    Smokeless tobacco: Never   Substance Use Topics    Alcohol use: Yes     Comment: socially    Drug use: Never     Review of Systems   Constitutional: Negative.  Negative for fever.   HENT: Negative.     Eyes: Negative.    Respiratory: Negative.  Negative for apnea, cough, choking, chest tightness, shortness of breath, wheezing and stridor.    Cardiovascular: Negative.  Negative for chest pain, palpitations and leg swelling.   Gastrointestinal: Negative.    Genitourinary: Negative.    Musculoskeletal: Negative.  Negative for arthralgias, back pain, gait problem, joint swelling, myalgias, neck pain and neck stiffness.   Skin: Negative.    Neurological:  Positive for numbness and headaches. Negative for dizziness, tremors, seizures, syncope, facial asymmetry, speech difficulty, weakness and light-headedness.   Psychiatric/Behavioral: Negative.     All other systems reviewed and are negative.    Physical Exam     Initial Vitals [02/22/23 1752]   BP Pulse Resp Temp  SpO2   (!) 162/83 84 16 98.6 °F (37 °C) 98 %      MAP       --         Physical Exam    Nursing note and vitals reviewed.  Constitutional: He appears well-developed and well-nourished. He is not diaphoretic. No distress.   HENT:   Head: Normocephalic and atraumatic.   Right Ear: External ear normal.   Left Ear: External ear normal.   Nose: Nose normal.   Mouth/Throat: Oropharynx is clear and moist. No oropharyngeal exudate.   Eyes: Conjunctivae and EOM are normal. Pupils are equal, round, and reactive to light. Right eye exhibits no discharge. Left eye exhibits no discharge. No scleral icterus.   Neck: Neck supple. No JVD present.   Normal range of motion.  Cardiovascular:  Normal rate, regular rhythm and intact distal pulses.           No murmur heard.  Pulmonary/Chest: Breath sounds normal. No stridor. No respiratory distress. He has no wheezes. He has no rhonchi. He has no rales.   Abdominal: Abdomen is soft. Bowel sounds are normal. He exhibits no distension. There is no abdominal tenderness.   Musculoskeletal:         General: No tenderness or edema. Normal range of motion.      Cervical back: Normal range of motion and neck supple.     Lymphadenopathy:     He has no cervical adenopathy.   Neurological: He is alert and oriented to person, place, and time. He has normal strength. No cranial nerve deficit or sensory deficit. GCS score is 15. GCS eye subscore is 4. GCS verbal subscore is 5. GCS motor subscore is 6.   Skin: Skin is warm and dry. Capillary refill takes less than 2 seconds. No rash noted. No erythema. No pallor.   Psychiatric: He has a normal mood and affect. His behavior is normal.       Medical Screening Exam   See Full Note    ED Course   Procedures  Labs Reviewed - No data to display       Imaging Results    None          Medications - No data to display  Medical Decision Making:   History:   I obtained history from: someone other than patient.       <> Summary of History: Some of the history is  also obtained from the patient's spouse.  She reports she thinks his primary doctor sending him up to see a neurologist but is unsure.          Old Medical Records: I decided to obtain old medical records.  Old Records Summarized: records from clinic visits and records from previous admission(s).       <> Summary of Records: Review of records indicates patient had full workup less than 24 hours ago including head CT scan which did not show any abnormality.  Also shows patient has had numerous occasions of blood pressures over 200 systolic.  It does not appear patient has ever seen a cardiologist or neurologist for evaluation.  Initial Assessment:   TIA or migraine equivalent  Differential Diagnosis:   Differential diagnosis includes acute ischemic stroke, TIA, migraine equivalent, hypertensive emergency/urgency at home but now resolved  Clinical Tests:   Lab Tests: Reviewed       <> Summary of Lab: Reviewed labs done less than 24 hours go, CBC and basic profile unremarkable.  Minimal elevation of BUN and creatinine noted but not significant on an emergent basis.  Radiological Study: Reviewed  ED Management:  Discussed with patient need for better blood pressure control and evaluation by Cardiology and Neurology.  Recommended not to repeat a CT scan at this time as he has already had 2 in less than 12 months and is not currently having symptoms consistent with an acute ischemic or hemorrhagic stroke.  Discussed with patient importance for following up and controlling blood pressure and we will request outpatient appointment with Cardiology and Neurology. radiologist's report for CT scan done less than 24 hours ago which showed no acute abnormality.  Patient also had a CT scan done in May 2022 which also showed no acute abnormality.  Patient had a chest x-ray done less than 24 hours ago which showed no abnormality.                 Clinical Impression:   Final diagnoses:  [R20.0] Numbness (Primary)  [G43.109] Migraine  equivalent syndrome        ED Disposition Condition    Discharge Stable          ED Prescriptions    None       Follow-up Information       Follow up With Specialties Details Why Contact Info    Lizette Alvarenga MD Family Medicine Schedule an appointment as soon as possible for a visit   [address] [place] [address] [phone]               Christofer Roth DO  02/22/23 2599

## 2023-03-08 ENCOUNTER — OFFICE VISIT (OUTPATIENT)
Dept: NEUROLOGY | Facility: CLINIC | Age: 53
End: 2023-03-08
Payer: COMMERCIAL

## 2023-03-08 VITALS
WEIGHT: 216 LBS | DIASTOLIC BLOOD PRESSURE: 78 MMHG | SYSTOLIC BLOOD PRESSURE: 140 MMHG | BODY MASS INDEX: 31.99 KG/M2 | OXYGEN SATURATION: 99 % | RESPIRATION RATE: 18 BRPM | HEART RATE: 77 BPM | HEIGHT: 69 IN

## 2023-03-08 DIAGNOSIS — E78.5 HYPERLIPIDEMIA, UNSPECIFIED HYPERLIPIDEMIA TYPE: ICD-10-CM

## 2023-03-08 DIAGNOSIS — I63.89 OTHER CEREBRAL INFARCTION: ICD-10-CM

## 2023-03-08 DIAGNOSIS — G45.9 TIA (TRANSIENT ISCHEMIC ATTACK): Primary | ICD-10-CM

## 2023-03-08 DIAGNOSIS — I10 HYPERTENSION, UNSPECIFIED TYPE: ICD-10-CM

## 2023-03-08 DIAGNOSIS — E53.8 VITAMIN B12 DEFICIENCY: ICD-10-CM

## 2023-03-08 DIAGNOSIS — E03.9 HYPOTHYROIDISM, UNSPECIFIED TYPE: ICD-10-CM

## 2023-03-08 PROCEDURE — 99204 PR OFFICE/OUTPT VISIT, NEW, LEVL IV, 45-59 MIN: ICD-10-PCS | Mod: S$PBB,,, | Performed by: NURSE PRACTITIONER

## 2023-03-08 PROCEDURE — 99204 OFFICE O/P NEW MOD 45 MIN: CPT | Mod: S$PBB,,, | Performed by: NURSE PRACTITIONER

## 2023-03-08 PROCEDURE — 99215 OFFICE O/P EST HI 40 MIN: CPT | Mod: PBBFAC | Performed by: NURSE PRACTITIONER

## 2023-03-08 PROCEDURE — 85610 PROTHROMBIN TIME: CPT | Performed by: NURSE PRACTITIONER

## 2023-03-08 RX ORDER — ATORVASTATIN CALCIUM 40 MG/1
40 TABLET, FILM COATED ORAL DAILY
Qty: 90 TABLET | Refills: 3 | Status: SHIPPED | OUTPATIENT
Start: 2023-03-08 | End: 2024-01-29 | Stop reason: SDUPTHER

## 2023-03-08 RX ORDER — ASPIRIN 81 MG/1
81 TABLET ORAL DAILY
COMMUNITY

## 2023-03-08 NOTE — PATIENT INSTRUCTIONS
MRI brain  CTA head and neck  Echocardiogram  Hypercoag panel  Start lipitor 40mg daily  Continue aspirin 81mg daily  Continue hypertensive treatment    Stroke risk modifiers:  1. Good sleep habits, recommend at least 7 hours total sleep daily  2. Continue management of blood pressure and cholesterol including medications, exercise, and good eating habits  3. Exercise as much as possible, recommend 5 days of the week for 30 minutes each day  4. Avoid smoking and alcohol  5. Go to the nearest emergency department immediately if any new or worsening weakness, speech difficulty, or numbness

## 2023-03-08 NOTE — PROGRESS NOTES
Subjective:       Patient ID: Zach Briscoe is a 52 y.o. male     Chief Complaint:    Chief Complaint   Patient presents with    Referral    Numbness        Allergies:  Patient has no known allergies.    Current Medications:    Outpatient Encounter Medications as of 3/8/2023   Medication Sig Dispense Refill    amLODIPine (NORVASC) 10 MG tablet Take 1 tablet (10 mg total) by mouth once daily. 90 tablet 3    aspirin (ECOTRIN) 81 MG EC tablet Take 81 mg by mouth once daily.      benazepril-hydrochlorthiazide (LOTENSIN HCT) 20-25 mg Tab Take 1 tablet by mouth once daily. 90 tablet 3    multivitamin capsule Take 1 capsule by mouth once daily.      atorvastatin (LIPITOR) 40 MG tablet Take 1 tablet (40 mg total) by mouth once daily. 90 tablet 3     No facility-administered encounter medications on file as of 3/8/2023.       History of Present Illness  53 y/o male new referral to neurology for reported numbness to the right side of his body.    Was seen in the ED on 2/22/23, twice actually.  The first visit was for headache and dizziness.  He had CT head done which was not revealing.  He returned to the ED later that day with reported right sided numbness.  He has history of HTN.  He was actually prior followed here in neurology in 2017 after similar events in late 2016.  His workup then including MRI brain did not reflect any acute findings, but he was hypertensive then and not taking his medications.  He was started on ASA daily.  His other workup including CTA and MRA were as follows:    MRA [place] [facility] [place] without contrast showed an abnormal study, with absence of signal to indicate flow of the the primary and secondary branches right MCA, and the right a one segment. Diminished flow the peripheral branches of the left posterior cerebral artery. You may wish to consider conventional cerebral angiography.  CT angiogram neck showed no hemodynamically significant stenosis. 41% diameter reduction narrowing of the  right ICA and 10% diameter reduction narrowing of the left ICA.  CT angiogram head showed 1. short segment occlusion involving the right M1 segment MCA is demonstrated with significant narrowing additionally present involving the distal right supraclinoid segment ICA. Occlusion of the distal supraclinoid segment is suggested. The right A1 segment is not identified. 2. There appears be perfusion of the M2 and M3 branches of the right MCA. Source of collateralization is not clearly identified. 3. Right MARK appears to be supplied via crossover and patent anterior communicating artery.    He was apparently no longer taking the ASA at time of the events, but has since restarted.  I note his total cholesterol was greater than 200, but his LDL was 144, given prior TIA his target there should be more like 70.  He is not on statin but will start him on lipitor 40mg daily.  On exam I note slight pronator drift in the LUE, he says at time of the ED visit he had right sided numbness, now says that is fine and he has left sided numbness, which is what he reported prior in 2016 at time of the TIA.  Given his history he needs repeat CVA workup including MRI brain, CTA head and neck and echo. He is without insurance, but will try to see what we can obtain.         Review of Systems  Review of Systems   Constitutional:  Negative for diaphoresis and fever.   HENT:  Negative for congestion, hearing loss and tinnitus.    Eyes:  Negative for blurred vision, double vision, photophobia, discharge and redness.   Respiratory:  Negative for cough and shortness of breath.    Cardiovascular:  Negative for chest pain.   Gastrointestinal:  Negative for abdominal pain, nausea and vomiting.   Musculoskeletal:  Negative for back pain, joint pain, myalgias and neck pain.   Skin:  Negative for itching and rash.   Neurological:  Positive for sensory change and headaches. Negative for dizziness, tremors, speech change, focal weakness, seizures, loss of  consciousness and weakness.   Psychiatric/Behavioral:  Positive for memory loss. Negative for depression and hallucinations. The patient does not have insomnia.    All other systems reviewed and are negative.   Objective:     NEUROLOGICAL EXAMINATION:     MENTAL STATUS   Oriented to person, place, and time.   Registration: recalls 3 of 3 objects. Recall at 5 minutes: recalls 3 of 3 objects.   Attention: normal. Concentration: normal.   Speech: speech is normal   Level of consciousness: alert  Knowledge: good and consistent with education.   Normal comprehension.     CRANIAL NERVES     CN II   Visual fields full to confrontation.   Visual acuity: normal  Right visual field deficit: none  Left visual field deficit: none     CN III, IV, VI   Pupils are equal, round, and reactive to light.  Extraocular motions are normal.   Right pupil: Size: 3 mm. Shape: regular. Reactivity: brisk. Consensual response: intact. Accommodation: intact.   Left pupil: Size: 3 mm. Shape: regular. Reactivity: brisk. Consensual response: intact. Accommodation: intact.   CN III: no CN III palsy  CN VI: no CN VI palsy  Nystagmus: none   Diplopia: none  Upgaze: normal  Downgaze: normal  Conjugate gaze: present  Vestibulo-ocular reflex: present    CN V   Facial sensation intact.   Right facial sensation deficit: none  Left facial sensation deficit: none  Right corneal reflex: normal  Left corneal reflex: normal    CN VII   Facial expression full, symmetric.   Right facial weakness: none  Left facial weakness: none  Right taste: normal  Left taste: normal    CN VIII   CN VIII normal.   Hearing: intact    CN IX, X   CN IX normal.   CN X normal.   Palate: symmetric    CN XI   CN XI normal.   Right sternocleidomastoid strength: normal  Left sternocleidomastoid strength: normal  Right trapezius strength: normal  Left trapezius strength: normal    CN XII   CN XII normal.   Tongue: not atrophic  Fasciculations: absent  Tongue deviation: none    MOTOR  EXAM   Muscle bulk: normal  Overall muscle tone: normal  Right arm tone: normal  Left arm tone: normal  Right arm pronator drift: absent  Left arm pronator drift: absent  Right leg tone: normal  Left leg tone: normal    Strength   Right neck flexion: 5/5  Left neck flexion: 5/5  Right neck extension: 5/5  Left neck extension: /5  Right deltoid: 5/5  Left deltoid: 5/5  Right biceps:   Left biceps:   Right triceps:   Left triceps: /  Right wrist flexion:   Left wrist flexion: 5/5  Right wrist extension: 5/5  Left wrist extension:   Right interossei:   Left interossei:   Right iliopsoas:   Left iliopsoas:   Right quadriceps:   Left quadriceps:   Right hamstrin/5  Left hamstrin/5  Right anterior tibial:   Left anterior tibial:   Right posterior tibial:   Left posterior tibial:   Right gastroc:   Left gastroc:     REFLEXES     Reflexes   Right brachioradialis: 2+  Left brachioradialis: 2+  Right biceps: 2+  Left biceps: 2+  Right triceps: 2+  Left triceps: 2+  Right patellar: 2+  Left patellar: 2+  Right achilles: 2+  Left achilles: 2+  Right plantar: normal  Left plantar: normal  Right Wong: absent  Left Wong: absent  Right ankle clonus: absent  Left ankle clonus: absent  Right pendular knee jerk: absent  Left pendular knee jerk: absent    SENSORY EXAM   Light touch normal.   Right arm light touch: normal  Left arm light touch: normal  Right leg light touch: normal  Left leg light touch: normal  Vibration normal.   Right arm vibration: normal  Left arm vibration: normal  Right leg vibration: normal  Left leg vibration: normal  Proprioception normal.   Right arm proprioception: normal  Left arm proprioception: normal  Right leg proprioception: normal  Left leg proprioception: normal  Pinprick normal.   Right arm pinprick: normal  Left arm pinprick: normal  Right leg pinprick: normal  Left leg pinprick: normal  Graphesthesia: normal  Romberg:  negative  Stereognosis: normal    GAIT AND COORDINATION     Gait  Gait: normal     Coordination   Finger to nose coordination: normal  Heel to shin coordination: normal  Tandem walking coordination: normal    Tremor   Resting tremor: absent  Intention tremor: absent  Action tremor: absent     Physical Exam  Vitals and nursing note reviewed.   Constitutional:       Appearance: Normal appearance.   HENT:      Head: Normocephalic.   Eyes:      Extraocular Movements: EOM normal.      Pupils: Pupils are equal, round, and reactive to light.   Cardiovascular:      Rate and Rhythm: Normal rate and regular rhythm.      Pulses: Normal pulses.      Heart sounds: Normal heart sounds.   Pulmonary:      Effort: Pulmonary effort is normal.      Breath sounds: Normal breath sounds.   Musculoskeletal:         General: Normal range of motion.      Cervical back: Normal range of motion and neck supple.   Skin:     General: Skin is warm and dry.   Neurological:      General: No focal deficit present.      Mental Status: He is alert and oriented to person, place, and time.      Cranial Nerves: No cranial nerve deficit.      Sensory: No sensory deficit.      Motor: No weakness.      Coordination: Coordination normal. Finger-Nose-Finger Test, Heel to Shin Test and Romberg Test normal.      Gait: Gait is intact. Gait and tandem walk normal.      Deep Tendon Reflexes: Reflexes normal.      Reflex Scores:       Tricep reflexes are 2+ on the right side and 2+ on the left side.       Bicep reflexes are 2+ on the right side and 2+ on the left side.       Brachioradialis reflexes are 2+ on the right side and 2+ on the left side.       Patellar reflexes are 2+ on the right side and 2+ on the left side.       Achilles reflexes are 2+ on the right side and 2+ on the left side.  Psychiatric:         Mood and Affect: Mood normal.         Speech: Speech normal.         Behavior: Behavior normal.        Assessment:     Problem List Items Addressed This  Visit          Neuro    TIA (transient ischemic attack) - Primary    Relevant Orders    Antithrombin III    Comprehensive Metabolic Panel    Protein S Antigen, Free    Homocysteine, Serum    Sickle Cell Screen    Protime-INR    Echo Saline Bubble? Yes       Cardiac/Vascular    Hypertension    Hyperlipidemia    Relevant Medications    atorvastatin (LIPITOR) 40 MG tablet       Endocrine    Vitamin B12 deficiency    Relevant Orders    Folate    Vitamin B12    Hypothyroidism    Relevant Orders    TSH     Other Visit Diagnoses       Other cerebral infarction        Relevant Orders    MRI Brain Without Contrast    CTA Head    CTA Neck             Primary Diagnosis and ICD10  TIA (transient ischemic attack) [G45.9]    Plan:     Patient Instructions   MRI brain  CTA head and neck  Echocardiogram  Hypercoag panel  Start lipitor 40mg daily  Continue aspirin 81mg daily  Continue hypertensive treatment    Stroke risk modifiers:  1. Good sleep habits, recommend at least 7 hours total sleep daily  2. Continue management of blood pressure and cholesterol including medications, exercise, and good eating habits  3. Exercise as much as possible, recommend 5 days of the week for 30 minutes each day  4. Avoid smoking and alcohol  5. Go to the nearest emergency department immediately if any new or worsening weakness, speech difficulty, or numbness     There are no discontinued medications.    Requested Prescriptions     Signed Prescriptions Disp Refills    atorvastatin (LIPITOR) 40 MG tablet 90 tablet 3     Sig: Take 1 tablet (40 mg total) by mouth once daily.       Orders Placed This Encounter   Procedures    MRI Brain Without Contrast    CTA Head    CTA Neck    Antithrombin III    Comprehensive Metabolic Panel    Folate    Protein S Antigen, Free    Homocysteine, Serum    Vitamin B12    TSH    Sickle Cell Screen    Protime-INR    Echo Saline Bubble? Yes

## 2023-03-13 DIAGNOSIS — E53.8 VITAMIN B12 DEFICIENCY: Primary | ICD-10-CM

## 2023-03-13 RX ORDER — FOLIC ACID/VIT BCOMP,C/CU/ZINC 5-1.5-25MG
1 TABLET ORAL DAILY
Qty: 30 EACH | Refills: 11 | Status: SHIPPED | OUTPATIENT
Start: 2023-03-13

## 2023-03-14 ENCOUNTER — TELEPHONE (OUTPATIENT)
Dept: NEUROLOGY | Facility: CLINIC | Age: 53
End: 2023-03-14

## 2023-03-14 NOTE — TELEPHONE ENCOUNTER
Called pt with lab results told him his homocystine is high and vitamin b 12 low per NOLA Patel NP. Also told him rx foe folbee sent to pharmacy for this. He v/u.          ----- Message from DORETHA Avila sent at 3/13/2023 10:57 AM CDT -----  Homocystine low, as is B12.  I am sending in folbee Rx for him to take as directed

## 2023-03-27 ENCOUNTER — OFFICE VISIT (OUTPATIENT)
Dept: PRIMARY CARE CLINIC | Facility: CLINIC | Age: 53
End: 2023-03-27
Payer: COMMERCIAL

## 2023-03-27 VITALS
TEMPERATURE: 98 F | SYSTOLIC BLOOD PRESSURE: 140 MMHG | DIASTOLIC BLOOD PRESSURE: 70 MMHG | BODY MASS INDEX: 30.96 KG/M2 | OXYGEN SATURATION: 99 % | WEIGHT: 209 LBS | HEIGHT: 69 IN | HEART RATE: 72 BPM | RESPIRATION RATE: 18 BRPM

## 2023-03-27 DIAGNOSIS — G45.9 TIA (TRANSIENT ISCHEMIC ATTACK): ICD-10-CM

## 2023-03-27 DIAGNOSIS — I10 PRIMARY HYPERTENSION: Primary | ICD-10-CM

## 2023-03-27 PROCEDURE — 99212 OFFICE O/P EST SF 10 MIN: CPT | Mod: ,,, | Performed by: FAMILY MEDICINE

## 2023-03-27 PROCEDURE — 99212 PR OFFICE/OUTPT VISIT, EST, LEVL II, 10-19 MIN: ICD-10-PCS | Mod: ,,, | Performed by: FAMILY MEDICINE

## 2023-03-27 RX ORDER — FOLIC ACID/VIT B COMPLEX AND C 5 MG
1 TABLET ORAL
COMMUNITY
Start: 2023-03-16

## 2023-03-27 NOTE — PROGRESS NOTES
Subjective:       Patient ID: Zach Briscoe is a 52 y.o. male.    Chief Complaint: Follow-up    Followup after TIA. He does have cerebral vascular disease. He is to see a cardiologist Thursday. He states that he feels great.    Follow-up  Pertinent negatives include no arthralgias, chest pain, coughing, fatigue, fever, headaches, myalgias, nausea or vomiting.   Review of Systems   Constitutional:  Negative for fatigue and fever.   HENT:  Negative for dental problem.    Eyes:  Negative for discharge.   Respiratory:  Negative for cough, choking, chest tightness and shortness of breath.    Cardiovascular:  Negative for chest pain and leg swelling.   Gastrointestinal:  Negative for constipation, diarrhea, nausea and vomiting.   Genitourinary:  Negative for discharge and flank pain.   Musculoskeletal:  Negative for arthralgias and myalgias.   Allergic/Immunologic: Negative for environmental allergies.   Neurological:  Negative for headaches and memory loss.   Psychiatric/Behavioral:  Negative for behavioral problems and hallucinations.        Objective:      Physical Exam  Vitals and nursing note reviewed.   Constitutional:       Appearance: Normal appearance. He is normal weight.   HENT:      Head: Normocephalic and atraumatic.      Right Ear: Tympanic membrane normal.      Left Ear: Tympanic membrane normal.      Nose: Nose normal.      Mouth/Throat:      Mouth: Mucous membranes are moist.   Eyes:      Extraocular Movements: Extraocular movements intact.      Conjunctiva/sclera: Conjunctivae normal.      Pupils: Pupils are equal, round, and reactive to light.   Cardiovascular:      Rate and Rhythm: Normal rate and regular rhythm.      Pulses: Normal pulses.      Heart sounds: Murmur heard.   Systolic murmur is present with a grade of 1/6.   Pulmonary:      Effort: Pulmonary effort is normal.      Breath sounds: Normal breath sounds.   Abdominal:      General: Abdomen is flat. Bowel sounds are normal.       Palpations: Abdomen is soft.   Musculoskeletal:         General: Normal range of motion.      Cervical back: Normal range of motion and neck supple.      Right lower leg: No edema.      Left lower leg: No edema.   Skin:     General: Skin is warm and dry.   Neurological:      General: No focal deficit present.      Mental Status: He is alert and oriented to person, place, and time.   Psychiatric:         Mood and Affect: Mood normal.       Assessment:       Problem List Items Addressed This Visit          Neuro    TIA (transient ischemic attack)       Cardiac/Vascular    Primary hypertension - Primary       Plan:       RTC as needed  To not miss cardiology visit

## 2023-03-28 ENCOUNTER — HOSPITAL ENCOUNTER (OUTPATIENT)
Dept: RADIOLOGY | Facility: HOSPITAL | Age: 53
Discharge: HOME | End: 2023-03-28
Attending: NURSE PRACTITIONER
Payer: COMMERCIAL

## 2023-03-28 DIAGNOSIS — I63.89 OTHER CEREBRAL INFARCTION: Primary | ICD-10-CM

## 2023-03-28 DIAGNOSIS — I63.89 OTHER CEREBRAL INFARCTION: ICD-10-CM

## 2023-03-28 PROCEDURE — 70498 CTA NECK: ICD-10-PCS | Mod: 26,,, | Performed by: RADIOLOGY

## 2023-03-28 PROCEDURE — 70496 CT ANGIOGRAPHY HEAD: CPT | Mod: TC

## 2023-03-28 PROCEDURE — 25500020 PHARM REV CODE 255: Performed by: NURSE PRACTITIONER

## 2023-03-28 PROCEDURE — 70496 CT ANGIOGRAPHY HEAD: CPT | Mod: 26,,, | Performed by: RADIOLOGY

## 2023-03-28 PROCEDURE — 70496 CTA HEAD: ICD-10-PCS | Mod: 26,,, | Performed by: RADIOLOGY

## 2023-03-28 PROCEDURE — 70498 CT ANGIOGRAPHY NECK: CPT | Mod: 26,,, | Performed by: RADIOLOGY

## 2023-03-28 PROCEDURE — 70498 CT ANGIOGRAPHY NECK: CPT | Mod: TC

## 2023-03-28 RX ADMIN — IOPAMIDOL 100 ML: 755 INJECTION, SOLUTION INTRAVENOUS at 03:03

## 2023-03-29 ENCOUNTER — TELEPHONE (OUTPATIENT)
Dept: NEUROLOGY | Facility: CLINIC | Age: 53
End: 2023-03-29
Payer: COMMERCIAL

## 2023-03-29 NOTE — TELEPHONE ENCOUNTER
Called pt and gave him the information below from NOLA Patel NP. He v/u.            ----- Message from DORETHA Avila sent at 3/29/2023  7:33 AM CDT -----  CTA head and neck are similar to prior done in 2017.  He has atherosclerosis noted in the arteries of the head, but this is not surgical.  He needs to make sure he continues the ASA daily as directed as well as cholesterol and HTN medications.  CTA neck did not reveal any significant stenosis.

## 2023-03-30 DIAGNOSIS — I63.89 OTHER CEREBRAL INFARCTION: Primary | ICD-10-CM

## 2023-04-04 ENCOUNTER — HOSPITAL ENCOUNTER (OUTPATIENT)
Dept: CARDIOLOGY | Facility: HOSPITAL | Age: 53
Discharge: HOME | End: 2023-04-04
Attending: NURSE PRACTITIONER
Payer: COMMERCIAL

## 2023-04-04 DIAGNOSIS — G45.9 TIA (TRANSIENT ISCHEMIC ATTACK): ICD-10-CM

## 2023-04-04 PROCEDURE — 93306 TTE W/DOPPLER COMPLETE: CPT | Mod: 26,,, | Performed by: STUDENT IN AN ORGANIZED HEALTH CARE EDUCATION/TRAINING PROGRAM

## 2023-04-04 PROCEDURE — 93306 TTE W/DOPPLER COMPLETE: CPT

## 2023-04-04 PROCEDURE — 93306 ECHO (CUPID ONLY): ICD-10-PCS | Mod: 26,,, | Performed by: STUDENT IN AN ORGANIZED HEALTH CARE EDUCATION/TRAINING PROGRAM

## 2023-04-05 ENCOUNTER — OFFICE VISIT (OUTPATIENT)
Dept: NEUROLOGY | Facility: CLINIC | Age: 53
End: 2023-04-05
Payer: COMMERCIAL

## 2023-04-05 VITALS
BODY MASS INDEX: 31.55 KG/M2 | OXYGEN SATURATION: 94 % | HEART RATE: 71 BPM | WEIGHT: 213 LBS | HEIGHT: 69 IN | SYSTOLIC BLOOD PRESSURE: 134 MMHG | DIASTOLIC BLOOD PRESSURE: 90 MMHG

## 2023-04-05 DIAGNOSIS — E78.5 HYPERLIPIDEMIA, UNSPECIFIED HYPERLIPIDEMIA TYPE: ICD-10-CM

## 2023-04-05 DIAGNOSIS — G45.9 TIA (TRANSIENT ISCHEMIC ATTACK): Primary | ICD-10-CM

## 2023-04-05 DIAGNOSIS — I10 PRIMARY HYPERTENSION: ICD-10-CM

## 2023-04-05 PROCEDURE — 99212 OFFICE O/P EST SF 10 MIN: CPT | Mod: S$PBB,,, | Performed by: NURSE PRACTITIONER

## 2023-04-05 PROCEDURE — 99212 PR OFFICE/OUTPT VISIT, EST, LEVL II, 10-19 MIN: ICD-10-PCS | Mod: S$PBB,,, | Performed by: NURSE PRACTITIONER

## 2023-04-05 PROCEDURE — 99214 OFFICE O/P EST MOD 30 MIN: CPT | Mod: PBBFAC | Performed by: NURSE PRACTITIONER

## 2023-04-05 RX ORDER — CYANOCOBALAMIN/FOLIC AC/VIT B6 1-2.5-25MG
1 TABLET ORAL
COMMUNITY
Start: 2023-03-30 | End: 2023-04-05 | Stop reason: SDUPTHER

## 2023-04-05 NOTE — PATIENT INSTRUCTIONS
Continue current medications as directed  Notify clinic if any new weakness or other complications    Stroke risk modifiers:    1. Good sleep habits, recommend at least 7 hours total sleep daily  2. Continue management of blood pressure and cholesterol including medications, exercise, and good eating habits  3. Exercise as much as possible, recommend 5 days of the week for 30 minutes each day  4. Avoid smoking and alcohol  5. Go to the nearest emergency department immediately if any new or worsening weakness, speech difficulty, or numbness

## 2023-04-05 NOTE — LETTER
April 19, 2023      [facility] - [facility]rology  [address] [place] [address] [place] [address]  Phone: [phone]  Fax: [phone]       Patient: Zach Briscoe   YOB: 1970  Date of Visit: 04/19/2023    To Whom It May Concern:    Luis F Briscoe  was at [facility] on 04/19/2023. The patient may return to work/school on 4/24/2023 with no restrictions. Given evaluation in clinic and patient reports we feel he is able to return to work at this time.    If you have any questions or concerns, or if I can be of further assistance, please do not hesitate to contact me.    Sincerely,    DORETHA Avila

## 2023-04-05 NOTE — PROGRESS NOTES
Subjective:       Patient ID: Zach Briscoe is a 52 y.o. male     Chief Complaint:    Chief Complaint   Patient presents with    Follow-up        Allergies:  Patient has no known allergies.    Current Medications:    Outpatient Encounter Medications as of 4/5/2023   Medication Sig Dispense Refill    amLODIPine (NORVASC) 10 MG tablet Take 1 tablet (10 mg total) by mouth once daily. 90 tablet 3    aspirin (ECOTRIN) 81 MG EC tablet Take 81 mg by mouth once daily.      atorvastatin (LIPITOR) 40 MG tablet Take 1 tablet (40 mg total) by mouth once daily. 90 tablet 3    benazepril-hydrochlorthiazide (LOTENSIN HCT) 20-25 mg Tab Take 1 tablet by mouth once daily. 90 tablet 3    FOLBEE PLUS 5 mg Tab Take 1 tablet by mouth.      multivitamin capsule Take 1 capsule by mouth once daily.      vit b comp & c-fa-copper-zinc 5-1.5-25 mg (FOLBEE PLUS) 5-1.5-25 mg Tab Take 1 tablet by mouth once daily. 30 each 11    [DISCONTINUED] FOLBEE 2.5-25-1 mg Tab Take 1 tablet by mouth.       No facility-administered encounter medications on file as of 4/5/2023.       History of Present Illness  51 y/o male following in neurology for TIA    Was seen in the ED on 2/22/23, twice actually.  The first visit was for headache and dizziness.  He had CT head done which was not revealing.  He returned to the ED later that day with reported right sided numbness.  He has history of HTN.  He was actually prior followed here in neurology in 2017 after similar events in late 2016.  His workup then including MRI brain did not reflect any acute findings, but he was hypertensive then and not taking his medications.  He was started on ASA daily.  His other workup including CTA and MRA then indicated atherosclerosis, but non surgical.  He was apparently not taking the ASA any longer when this latest event happened, but restarted it.  His LDL was noted 144, we set target of 70 and I started him on lipitor 40mg daily.  We repeated CTA head and neck which  were essentially unchanged from prior done in 2017.   I had ordered an MRI brain but do not see where this was done.  His echo was done yesterday, results still pending at this time.  He did not have the MRI brain done, feels he does not need it as he is feeling fine with current treatment.    He was without insurance at time of initial visit.    He was referred to cardiology as well.         Review of Systems  Review of Systems   Constitutional:  Negative for diaphoresis and fever.   HENT:  Negative for congestion, hearing loss and tinnitus.    Eyes:  Negative for blurred vision, double vision, photophobia, discharge and redness.   Respiratory:  Negative for cough and shortness of breath.    Cardiovascular:  Negative for chest pain.   Gastrointestinal:  Negative for abdominal pain, nausea and vomiting.   Musculoskeletal:  Negative for back pain, joint pain, myalgias and neck pain.   Skin:  Negative for itching and rash.   Neurological:  Positive for sensory change and headaches. Negative for dizziness, tremors, speech change, focal weakness, seizures, loss of consciousness and weakness.   Psychiatric/Behavioral:  Positive for memory loss. Negative for depression and hallucinations. The patient does not have insomnia.    All other systems reviewed and are negative.   Objective:     NEUROLOGICAL EXAMINATION:     MENTAL STATUS   Oriented to person, place, and time.   Registration: recalls 3 of 3 objects. Recall at 5 minutes: recalls 3 of 3 objects.   Attention: normal. Concentration: normal.   Speech: speech is normal   Level of consciousness: alert  Knowledge: good and consistent with education.   Normal comprehension.     CRANIAL NERVES     CN II   Visual fields full to confrontation.   Visual acuity: normal  Right visual field deficit: none  Left visual field deficit: none     CN III, IV, VI   Pupils are equal, round, and reactive to light.  Extraocular motions are normal.   Right pupil: Size: 3 mm. Shape: regular.  Reactivity: brisk. Consensual response: intact. Accommodation: intact.   Left pupil: Size: 3 mm. Shape: regular. Reactivity: brisk. Consensual response: intact. Accommodation: intact.   CN III: no CN III palsy  CN VI: no CN VI palsy  Nystagmus: none   Diplopia: none  Upgaze: normal  Downgaze: normal  Conjugate gaze: present  Vestibulo-ocular reflex: present    CN V   Facial sensation intact.   Right facial sensation deficit: none  Left facial sensation deficit: none  Right corneal reflex: normal  Left corneal reflex: normal    CN VII   Facial expression full, symmetric.   Right facial weakness: none  Left facial weakness: none  Right taste: normal  Left taste: normal    CN VIII   CN VIII normal.   Hearing: intact    CN IX, X   CN IX normal.   CN X normal.   Palate: symmetric    CN XI   CN XI normal.   Right sternocleidomastoid strength: normal  Left sternocleidomastoid strength: normal  Right trapezius strength: normal  Left trapezius strength: normal    CN XII   CN XII normal.   Tongue: not atrophic  Fasciculations: absent  Tongue deviation: none    MOTOR EXAM   Muscle bulk: normal  Overall muscle tone: normal  Right arm tone: normal  Left arm tone: normal  Right arm pronator drift: absent  Left arm pronator drift: absent  Right leg tone: normal  Left leg tone: normal    Strength   Right neck flexion: 5/5  Left neck flexion: 5/5  Right neck extension: 5/5  Left neck extension: 5/5  Right deltoid: 5/5  Left deltoid: 5/5  Right biceps: 5/5  Left biceps: 5/5  Right triceps: 5/5  Left triceps: 5/5  Right wrist flexion: 5/5  Left wrist flexion: 5/5  Right wrist extension: 5/5  Left wrist extension: 5/5  Right interossei: 5/5  Left interossei: 5/5  Right iliopsoas: 5/5  Left iliopsoas: 5/5  Right quadriceps: 5/5  Left quadriceps: 5/5  Right hamstrin/5  Left hamstrin/5  Right anterior tibial: 5/5  Left anterior tibial: 5/5  Right posterior tibial: 5/5  Left posterior tibial: 5/5  Right gastroc: 5/5  Left gastroc:  5/5    REFLEXES     Reflexes   Right brachioradialis: 2+  Left brachioradialis: 2+  Right biceps: 2+  Left biceps: 2+  Right triceps: 2+  Left triceps: 2+  Right patellar: 2+  Left patellar: 2+  Right achilles: 2+  Left achilles: 2+  Right plantar: normal  Left plantar: normal  Right Wong: absent  Left Wong: absent  Right ankle clonus: absent  Left ankle clonus: absent  Right pendular knee jerk: absent  Left pendular knee jerk: absent    SENSORY EXAM   Light touch normal.   Right arm light touch: normal  Left arm light touch: normal  Right leg light touch: normal  Left leg light touch: normal  Vibration normal.   Right arm vibration: normal  Left arm vibration: normal  Right leg vibration: normal  Left leg vibration: normal  Proprioception normal.   Right arm proprioception: normal  Left arm proprioception: normal  Right leg proprioception: normal  Left leg proprioception: normal  Pinprick normal.   Right arm pinprick: normal  Left arm pinprick: normal  Right leg pinprick: normal  Left leg pinprick: normal  Graphesthesia: normal  Romberg: negative  Stereognosis: normal    GAIT AND COORDINATION     Gait  Gait: normal     Coordination   Finger to nose coordination: normal  Heel to shin coordination: normal  Tandem walking coordination: normal    Tremor   Resting tremor: absent  Intention tremor: absent  Action tremor: absent     Physical Exam  Vitals and nursing note reviewed.   Constitutional:       Appearance: Normal appearance.   HENT:      Head: Normocephalic.   Eyes:      Extraocular Movements: EOM normal.      Pupils: Pupils are equal, round, and reactive to light.   Cardiovascular:      Rate and Rhythm: Normal rate and regular rhythm.      Pulses: Normal pulses.      Heart sounds: Normal heart sounds.   Pulmonary:      Effort: Pulmonary effort is normal.      Breath sounds: Normal breath sounds.   Musculoskeletal:         General: Normal range of motion.      Cervical back: Normal range of motion and neck  supple.   Skin:     General: Skin is warm and dry.   Neurological:      General: No focal deficit present.      Mental Status: He is alert and oriented to person, place, and time.      Cranial Nerves: No cranial nerve deficit.      Sensory: No sensory deficit.      Motor: No weakness.      Coordination: Coordination normal. Finger-Nose-Finger Test, Heel to Shin Test and Romberg Test normal.      Gait: Gait is intact. Gait and tandem walk normal.      Deep Tendon Reflexes: Reflexes normal.      Reflex Scores:       Tricep reflexes are 2+ on the right side and 2+ on the left side.       Bicep reflexes are 2+ on the right side and 2+ on the left side.       Brachioradialis reflexes are 2+ on the right side and 2+ on the left side.       Patellar reflexes are 2+ on the right side and 2+ on the left side.       Achilles reflexes are 2+ on the right side and 2+ on the left side.  Psychiatric:         Mood and Affect: Mood normal.         Speech: Speech normal.         Behavior: Behavior normal.        Assessment:     Problem List Items Addressed This Visit          Neuro    TIA (transient ischemic attack) - Primary       Cardiac/Vascular    Primary hypertension    Hyperlipidemia          Primary Diagnosis and ICD10  TIA (transient ischemic attack) [G45.9]    Plan:     Patient Instructions   Continue current medications as directed  Notify clinic if any new weakness or other complications    Stroke risk modifiers:    1. Good sleep habits, recommend at least 7 hours total sleep daily  2. Continue management of blood pressure and cholesterol including medications, exercise, and good eating habits  3. Exercise as much as possible, recommend 5 days of the week for 30 minutes each day  4. Avoid smoking and alcohol  5. Go to the nearest emergency department immediately if any new or worsening weakness, speech difficulty, or numbness     Medications Discontinued During This Encounter   Medication Reason    FOLBEE 2.5-25-1 mg Tab  Duplicate Order       Requested Prescriptions      No prescriptions requested or ordered in this encounter       No orders of the defined types were placed in this encounter.

## 2023-04-12 LAB
AORTIC VALVE CUSP SEPERATION: 1.72 CM
AV INDEX (PROSTH): 0.53
AV MEAN GRADIENT: 15 MMHG
AV PEAK GRADIENT: 34 MMHG
AV REGURGITATION PRESSURE HALF TIME: 505 MS
AV VALVE AREA: 1.43 CM2
AV VELOCITY RATIO: 0.55
CV ECHO LV RWT: 0.66 CM
DOP CALC AO PEAK VEL: 2.9 M/S
DOP CALC AO VTI: 57.46 CM
DOP CALC LVOT AREA: 2.7 CM2
DOP CALC LVOT DIAMETER: 1.86 CM
DOP CALC LVOT PEAK VEL: 1.6 M/S
DOP CALC LVOT STROKE VOLUME: 82.4 CM3
DOP CALCLVOT PEAK VEL VTI: 30.34 CM
E WAVE DECELERATION TIME: 275 MSEC
ECHO LV POSTERIOR WALL: 1.38 CM (ref 0.6–1.1)
EJECTION FRACTION: 55 %
FRACTIONAL SHORTENING: 28 % (ref 28–44)
INTERVENTRICULAR SEPTUM: 1.5 CM (ref 0.6–1.1)
LEFT ATRIUM SIZE: 3.4 CM
LEFT INTERNAL DIMENSION IN SYSTOLE: 3.03 CM (ref 2.1–4)
LEFT VENTRICLE DIASTOLIC VOLUME INDEX: 36.86 ML/M2
LEFT VENTRICLE DIASTOLIC VOLUME: 78.14 ML
LEFT VENTRICLE MASS INDEX: 110 G/M2
LEFT VENTRICLE SYSTOLIC VOLUME INDEX: 16.9 ML/M2
LEFT VENTRICLE SYSTOLIC VOLUME: 35.86 ML
LEFT VENTRICULAR INTERNAL DIMENSION IN DIASTOLE: 4.19 CM (ref 3.5–6)
LEFT VENTRICULAR MASS: 233.42 G
LVOT MG: 4.6 MMHG
MV PEAK E VEL: 0.84 M/S
PISA AR MAX VEL: 3.46 M/S
PISA TR MAX VEL: 2.77 M/S
RA PRESSURE: 3 MMHG
RIGHT ATRIAL AREA: 15.8 CM2
RIGHT VENTRICULAR END-DIASTOLIC DIMENSION: 2.6 CM
TR MAX PG: 31 MMHG
TV REST PULMONARY ARTERY PRESSURE: 34 MMHG

## 2023-04-13 ENCOUNTER — TELEPHONE (OUTPATIENT)
Dept: NEUROLOGY | Facility: CLINIC | Age: 53
End: 2023-04-13
Payer: COMMERCIAL

## 2023-04-13 NOTE — TELEPHONE ENCOUNTER
Called and informed pt that his Echo looked good.voiced . At this time pt requested to change the date for his MRI of the brain. Rescheduled MRI for 4/27/2023 at 1:45 pm. Pt understanding by verbal return            ----- Message from Oscar Garay CMA sent at 4/13/2023  8:15 AM CDT -----    ----- Message -----  From: DORETHA Avila  Sent: 4/13/2023   7:10 AM CDT  To: Oscar Garay CMA    Echocardiogram looks good

## 2023-05-30 ENCOUNTER — TELEPHONE (OUTPATIENT)
Dept: NEUROLOGY | Facility: CLINIC | Age: 53
End: 2023-05-30
Payer: COMMERCIAL

## 2023-05-30 NOTE — TELEPHONE ENCOUNTER
Called pt and gave him the information below from NOLA Patel NP. Also told him to make sure he takes his aspirin, cholesterol and blood pressure meds daily. He v/u.        ----- Message from DORETHA Avila sent at 5/30/2023  5:58 AM CDT -----  Let him know the MRI brain shows he likely had CVA and not a TIA when he had the recent reported events of weakness.  The MR shows subacute CVA left occipital and left temporal area of brain.  Make sure he is taking his aspirin, cholesterol, and HTN meds as directed.

## 2023-06-12 PROBLEM — G45.9 TIA (TRANSIENT ISCHEMIC ATTACK): Status: RESOLVED | Noted: 2023-03-08 | Resolved: 2023-06-12

## 2023-09-01 ENCOUNTER — OFFICE VISIT (OUTPATIENT)
Dept: PRIMARY CARE CLINIC | Facility: CLINIC | Age: 53
End: 2023-09-01

## 2023-09-01 VITALS
HEART RATE: 89 BPM | OXYGEN SATURATION: 98 % | WEIGHT: 216 LBS | BODY MASS INDEX: 31.99 KG/M2 | RESPIRATION RATE: 20 BRPM | TEMPERATURE: 99 F | DIASTOLIC BLOOD PRESSURE: 78 MMHG | SYSTOLIC BLOOD PRESSURE: 138 MMHG | HEIGHT: 69 IN

## 2023-09-01 DIAGNOSIS — R50.9 FEVER, UNSPECIFIED FEVER CAUSE: Primary | ICD-10-CM

## 2023-09-01 DIAGNOSIS — U07.1 COVID: ICD-10-CM

## 2023-09-01 LAB
CTP QC/QA: YES
SARS-COV-2 AG RESP QL IA.RAPID: POSITIVE

## 2023-09-01 PROCEDURE — 87426 SARSCOV CORONAVIRUS AG IA: CPT | Mod: QW,,, | Performed by: FAMILY MEDICINE

## 2023-09-01 PROCEDURE — 99213 OFFICE O/P EST LOW 20 MIN: CPT | Mod: ,,, | Performed by: FAMILY MEDICINE

## 2023-09-01 PROCEDURE — 99213 PR OFFICE/OUTPT VISIT, EST, LEVL III, 20-29 MIN: ICD-10-PCS | Mod: ,,, | Performed by: FAMILY MEDICINE

## 2023-09-01 PROCEDURE — 87426 SARS CORONAVIRUS 2 ANTIGEN POCT: ICD-10-PCS | Mod: QW,,, | Performed by: FAMILY MEDICINE

## 2023-09-01 RX ORDER — AZITHROMYCIN 250 MG/1
250 TABLET, FILM COATED ORAL DAILY
Qty: 10 TABLET | Refills: 0 | Status: SHIPPED | OUTPATIENT
Start: 2023-09-01

## 2023-09-01 RX ORDER — METHYLPREDNISOLONE 4 MG/1
TABLET ORAL
Qty: 21 EACH | Refills: 0 | Status: SHIPPED | OUTPATIENT
Start: 2023-09-01 | End: 2023-09-22

## 2023-09-01 RX ORDER — BROMPHENIRAMINE MALEATE, PSEUDOEPHEDRINE HYDROCHLORIDE, AND DEXTROMETHORPHAN HYDROBROMIDE 2; 30; 10 MG/5ML; MG/5ML; MG/5ML
5 SYRUP ORAL
Qty: 150 ML | Refills: 1 | Status: SHIPPED | OUTPATIENT
Start: 2023-09-01 | End: 2023-09-11

## 2023-09-01 NOTE — LETTER
September 1, 2023      [facility]  [address]   [place] [address]  Phone: [phone]  Fax: [phone]       Patient: Zach Briscoe   YOB: 1970  Date of Visit: 09/01/2023    To Whom It May Concern:    Luis F Briscoe  was at [facility] on 09/01/2023. The patient may return to work/school on 09/07/2023 with no restrictions. If you have any questions or concerns, or if I can be of further assistance, please do not hesitate to contact me.    Sincerely,    Deb Scott LPN

## 2023-09-01 NOTE — PROGRESS NOTES
Subjective     Patient ID: Zach Briscoe is a 53 y.o. male.    Chief Complaint: Generalized Body Aches, Headache, Fever, and Cough    COVID positive.    Headache   Associated symptoms include coughing and a fever. Pertinent negatives include no nausea or vomiting.   Fever   Associated symptoms include coughing and headaches. Pertinent negatives include no chest pain, diarrhea, nausea or vomiting.   Cough  Associated symptoms include a fever and headaches. Pertinent negatives include no chest pain, myalgias or shortness of breath. There is no history of environmental allergies.     Review of Systems   Constitutional:  Positive for fever. Negative for fatigue.   HENT:  Negative for dental problem.    Eyes:  Negative for discharge.   Respiratory:  Positive for cough. Negative for choking, chest tightness and shortness of breath.    Cardiovascular:  Negative for chest pain and leg swelling.   Gastrointestinal:  Negative for constipation, diarrhea, nausea and vomiting.   Genitourinary:  Negative for discharge and flank pain.   Musculoskeletal:  Negative for arthralgias and myalgias.   Allergic/Immunologic: Negative for environmental allergies.   Neurological:  Positive for headaches. Negative for memory loss.   Psychiatric/Behavioral:  Negative for behavioral problems and hallucinations.           Objective     Physical Exam  Vitals and nursing note reviewed.   Constitutional:       Appearance: Normal appearance. He is normal weight.   HENT:      Head: Normocephalic and atraumatic.      Right Ear: Tympanic membrane normal.      Left Ear: Tympanic membrane normal.      Nose: Congestion present.      Mouth/Throat:      Mouth: Mucous membranes are moist.   Eyes:      Extraocular Movements: Extraocular movements intact.      Conjunctiva/sclera: Conjunctivae normal.      Pupils: Pupils are equal, round, and reactive to light.   Cardiovascular:      Rate and Rhythm: Normal rate and regular rhythm.      Pulses: Normal  pulses.   Pulmonary:      Effort: Pulmonary effort is normal.      Breath sounds: Normal breath sounds.   Abdominal:      General: Abdomen is flat. Bowel sounds are normal.      Palpations: Abdomen is soft.   Musculoskeletal:         General: Normal range of motion.      Cervical back: Normal range of motion and neck supple.   Skin:     General: Skin is warm and dry.   Neurological:      General: No focal deficit present.      Mental Status: He is alert and oriented to person, place, and time.   Psychiatric:         Mood and Affect: Mood normal.            Assessment and Plan     1. Fever, unspecified fever cause  -     SARS Coronavirus 2 Antigen, POCT    2. COVID  -     azithromycin (Z-ATUL) 250 MG tablet; Take 1 tablet (250 mg total) by mouth once daily.  Dispense: 10 tablet; Refill: 0  -     methylPREDNISolone (MEDROL DOSEPACK) 4 mg tablet; use as directed  Dispense: 21 each; Refill: 0  -     brompheniramine-pseudoeph-DM (BROMFED DM) 2-30-10 mg/5 mL Syrp; Take 5 mLs by mouth every 6 (six) hours while awake. for 10 days  Dispense: 150 mL; Refill: 1        RTC as needed  Isolate x 5 days       No follow-ups on file.

## 2023-10-10 ENCOUNTER — TELEPHONE (OUTPATIENT)
Dept: PRIMARY CARE CLINIC | Facility: CLINIC | Age: 53
End: 2023-10-10

## 2024-01-29 ENCOUNTER — TELEPHONE (OUTPATIENT)
Dept: PRIMARY CARE CLINIC | Facility: CLINIC | Age: 54
End: 2024-01-29
Payer: COMMERCIAL

## 2024-01-29 ENCOUNTER — OFFICE VISIT (OUTPATIENT)
Dept: PRIMARY CARE CLINIC | Facility: CLINIC | Age: 54
End: 2024-01-29
Payer: COMMERCIAL

## 2024-01-29 VITALS
HEIGHT: 69 IN | SYSTOLIC BLOOD PRESSURE: 200 MMHG | HEART RATE: 88 BPM | BODY MASS INDEX: 33.47 KG/M2 | WEIGHT: 226 LBS | TEMPERATURE: 99 F | DIASTOLIC BLOOD PRESSURE: 90 MMHG | RESPIRATION RATE: 18 BRPM | OXYGEN SATURATION: 98 %

## 2024-01-29 DIAGNOSIS — Z11.59 NEED FOR HEPATITIS C SCREENING TEST: ICD-10-CM

## 2024-01-29 DIAGNOSIS — E78.5 HYPERLIPIDEMIA, UNSPECIFIED HYPERLIPIDEMIA TYPE: ICD-10-CM

## 2024-01-29 DIAGNOSIS — Z11.4 ENCOUNTER FOR SCREENING FOR HIV: ICD-10-CM

## 2024-01-29 DIAGNOSIS — I10 HYPERTENSION, UNSPECIFIED TYPE: ICD-10-CM

## 2024-01-29 DIAGNOSIS — I10 PRIMARY HYPERTENSION: Primary | ICD-10-CM

## 2024-01-29 DIAGNOSIS — Z12.5 SCREENING FOR MALIGNANT NEOPLASM OF PROSTATE: ICD-10-CM

## 2024-01-29 DIAGNOSIS — Z12.11 SCREENING FOR MALIGNANT NEOPLASM OF COLON: Primary | ICD-10-CM

## 2024-01-29 DIAGNOSIS — Z13.1 SCREENING FOR DIABETES MELLITUS (DM): ICD-10-CM

## 2024-01-29 LAB
ALBUMIN SERPL BCP-MCNC: 3.8 G/DL (ref 3.5–5)
ALBUMIN/GLOB SERPL: 0.8 {RATIO}
ALP SERPL-CCNC: 59 U/L (ref 45–115)
ALT SERPL W P-5'-P-CCNC: 29 U/L (ref 16–61)
ANION GAP SERPL CALCULATED.3IONS-SCNC: 8 MMOL/L (ref 7–16)
AST SERPL W P-5'-P-CCNC: 14 U/L (ref 15–37)
BASOPHILS # BLD AUTO: 0.01 K/UL (ref 0–0.2)
BASOPHILS NFR BLD AUTO: 0.2 % (ref 0–1)
BILIRUB SERPL-MCNC: 0.4 MG/DL (ref ?–1.2)
BUN SERPL-MCNC: 16 MG/DL (ref 7–18)
BUN/CREAT SERPL: 11 (ref 6–20)
CALCIUM SERPL-MCNC: 10.2 MG/DL (ref 8.5–10.1)
CHLORIDE SERPL-SCNC: 110 MMOL/L (ref 98–107)
CHOLEST SERPL-MCNC: 250 MG/DL (ref 0–200)
CHOLEST/HDLC SERPL: 6.1 {RATIO}
CO2 SERPL-SCNC: 26 MMOL/L (ref 21–32)
CREAT SERPL-MCNC: 1.52 MG/DL (ref 0.7–1.3)
DIFFERENTIAL METHOD BLD: ABNORMAL
EGFR (NO RACE VARIABLE) (RUSH/TITUS): 55 ML/MIN/1.73M2
EOSINOPHIL # BLD AUTO: 0.11 K/UL (ref 0–0.5)
EOSINOPHIL NFR BLD AUTO: 1.8 % (ref 1–4)
ERYTHROCYTE [DISTWIDTH] IN BLOOD BY AUTOMATED COUNT: 12 % (ref 11.5–14.5)
EST. AVERAGE GLUCOSE BLD GHB EST-MCNC: 120 MG/DL
GLOBULIN SER-MCNC: 4.7 G/DL (ref 2–4)
GLUCOSE SERPL-MCNC: 104 MG/DL (ref 74–106)
HBA1C MFR BLD HPLC: 5.8 % (ref 4.5–6.6)
HCT VFR BLD AUTO: 44.4 % (ref 40–54)
HCV AB SER QL: NORMAL
HDLC SERPL-MCNC: 41 MG/DL (ref 40–60)
HGB BLD-MCNC: 15 G/DL (ref 13.5–18)
HIV 1+O+2 AB SERPL QL: NORMAL
IMM GRANULOCYTES # BLD AUTO: 0.01 K/UL (ref 0–0.04)
IMM GRANULOCYTES NFR BLD: 0.2 % (ref 0–0.4)
LDLC SERPL CALC-MCNC: 160 MG/DL
LDLC/HDLC SERPL: 3.9 {RATIO}
LYMPHOCYTES # BLD AUTO: 1.3 K/UL (ref 1–4.8)
LYMPHOCYTES NFR BLD AUTO: 21.8 % (ref 27–41)
MCH RBC QN AUTO: 30.1 PG (ref 27–31)
MCHC RBC AUTO-ENTMCNC: 33.8 G/DL (ref 32–36)
MCV RBC AUTO: 89.2 FL (ref 80–96)
MONOCYTES # BLD AUTO: 0.51 K/UL (ref 0–0.8)
MONOCYTES NFR BLD AUTO: 8.6 % (ref 2–6)
MPC BLD CALC-MCNC: 9.1 FL (ref 9.4–12.4)
NEUTROPHILS # BLD AUTO: 4.01 K/UL (ref 1.8–7.7)
NEUTROPHILS NFR BLD AUTO: 67.4 % (ref 53–65)
NONHDLC SERPL-MCNC: 209 MG/DL
NRBC # BLD AUTO: 0 X10E3/UL
NRBC, AUTO (.00): 0 %
PLATELET # BLD AUTO: 286 K/UL (ref 150–400)
POTASSIUM SERPL-SCNC: 3.8 MMOL/L (ref 3.5–5.1)
PROT SERPL-MCNC: 8.5 G/DL (ref 6.4–8.2)
PSA SERPL-MCNC: 0.93 NG/ML
RBC # BLD AUTO: 4.98 M/UL (ref 4.6–6.2)
SODIUM SERPL-SCNC: 140 MMOL/L (ref 136–145)
TRIGL SERPL-MCNC: 247 MG/DL (ref 35–150)
VLDLC SERPL-MCNC: 49 MG/DL
WBC # BLD AUTO: 5.95 K/UL (ref 4.5–11)

## 2024-01-29 PROCEDURE — 85025 COMPLETE CBC W/AUTO DIFF WBC: CPT | Mod: ,,, | Performed by: CLINICAL MEDICAL LABORATORY

## 2024-01-29 PROCEDURE — 80061 LIPID PANEL: CPT | Mod: ,,, | Performed by: CLINICAL MEDICAL LABORATORY

## 2024-01-29 PROCEDURE — 87389 HIV-1 AG W/HIV-1&-2 AB AG IA: CPT | Mod: ,,, | Performed by: CLINICAL MEDICAL LABORATORY

## 2024-01-29 PROCEDURE — 83036 HEMOGLOBIN GLYCOSYLATED A1C: CPT | Mod: ,,, | Performed by: CLINICAL MEDICAL LABORATORY

## 2024-01-29 PROCEDURE — 86803 HEPATITIS C AB TEST: CPT | Mod: ,,, | Performed by: CLINICAL MEDICAL LABORATORY

## 2024-01-29 PROCEDURE — 99214 OFFICE O/P EST MOD 30 MIN: CPT | Mod: ,,, | Performed by: FAMILY MEDICINE

## 2024-01-29 PROCEDURE — G0103 PSA SCREENING: HCPCS | Mod: ,,, | Performed by: CLINICAL MEDICAL LABORATORY

## 2024-01-29 PROCEDURE — 80053 COMPREHEN METABOLIC PANEL: CPT | Mod: ,,, | Performed by: CLINICAL MEDICAL LABORATORY

## 2024-01-29 RX ORDER — BENAZEPRIL HYDROCHLORIDE AND HYDROCHLOROTHIAZIDE 20; 25 MG/1; MG/1
1 TABLET ORAL DAILY
Qty: 90 TABLET | Refills: 3 | Status: SHIPPED | OUTPATIENT
Start: 2024-01-29 | End: 2025-01-28

## 2024-01-29 RX ORDER — AMLODIPINE BESYLATE 10 MG/1
10 TABLET ORAL DAILY
Qty: 90 TABLET | Refills: 3 | Status: SHIPPED | OUTPATIENT
Start: 2024-01-29

## 2024-01-29 RX ORDER — ATORVASTATIN CALCIUM 40 MG/1
40 TABLET, FILM COATED ORAL DAILY
Qty: 90 TABLET | Refills: 3 | Status: SHIPPED | OUTPATIENT
Start: 2024-01-29 | End: 2025-01-28

## 2024-01-29 NOTE — PROGRESS NOTES
Subjective     Patient ID: Zach Briscoe is a 53 y.o. male.    Chief Complaint: Annual Exam (Blood work) and Medication Refill    Ran out of meds.      Review of Systems   Constitutional:  Negative for fatigue and fever.   HENT:  Negative for dental problem.    Eyes:  Negative for discharge.   Respiratory:  Negative for cough, choking, chest tightness and shortness of breath.    Cardiovascular:  Negative for chest pain and leg swelling.   Gastrointestinal:  Negative for constipation, diarrhea, nausea and vomiting.   Genitourinary:  Negative for discharge and flank pain.   Musculoskeletal:  Negative for arthralgias and myalgias.   Allergic/Immunologic: Negative for environmental allergies.   Neurological:  Negative for headaches and memory loss.   Psychiatric/Behavioral:  Negative for behavioral problems and hallucinations.           Objective     Physical Exam  Vitals and nursing note reviewed.   Constitutional:       Appearance: Normal appearance. He is normal weight.   HENT:      Head: Normocephalic and atraumatic.      Right Ear: Tympanic membrane normal.      Left Ear: Tympanic membrane normal.      Nose: Nose normal.      Mouth/Throat:      Mouth: Mucous membranes are moist.   Eyes:      Extraocular Movements: Extraocular movements intact.      Conjunctiva/sclera: Conjunctivae normal.      Pupils: Pupils are equal, round, and reactive to light.   Cardiovascular:      Rate and Rhythm: Normal rate and regular rhythm.      Pulses: Normal pulses.   Pulmonary:      Effort: Pulmonary effort is normal.      Breath sounds: Normal breath sounds.   Abdominal:      General: Abdomen is flat. Bowel sounds are normal.      Palpations: Abdomen is soft.   Musculoskeletal:         General: Normal range of motion.      Cervical back: Normal range of motion and neck supple.   Skin:     General: Skin is warm and dry.   Neurological:      General: No focal deficit present.      Mental Status: He is alert and oriented to  person, place, and time.   Psychiatric:         Mood and Affect: Mood normal.            Assessment and Plan     1. Primary hypertension  -     CBC Auto Differential; Future; Expected date: 01/29/2024  -     Lipid Panel; Future; Expected date: 01/29/2024  -     Comprehensive Metabolic Panel; Future; Expected date: 01/29/2024  -     benazepril-hydrochlorthiazide (LOTENSIN HCT) 20-25 mg Tab; Take 1 tablet by mouth once daily.  Dispense: 90 tablet; Refill: 3    2. Screening for malignant neoplasm of prostate  -     PSA, Screening; Future; Expected date: 01/29/2024    3. Screening for diabetes mellitus (DM)  -     Hemoglobin A1C; Future; Expected date: 01/29/2024    4. Need for hepatitis C screening test  -     Hepatitis C Antibody; Future; Expected date: 01/29/2024    5. Encounter for screening for HIV  -     HIV 1/2 Ag/Ab (4th Gen); Future; Expected date: 01/29/2024    6. Hypertension, unspecified type  -     amLODIPine (NORVASC) 10 MG tablet; Take 1 tablet (10 mg total) by mouth once daily.  Dispense: 90 tablet; Refill: 3    7. Hyperlipidemia, unspecified hyperlipidemia type  -     atorvastatin (LIPITOR) 40 MG tablet; Take 1 tablet (40 mg total) by mouth once daily.  Dispense: 90 tablet; Refill: 3        RTC 1 week for BP check         No follow-ups on file.

## 2024-01-30 ENCOUNTER — TELEPHONE (OUTPATIENT)
Dept: PRIMARY CARE CLINIC | Facility: CLINIC | Age: 54
End: 2024-01-30
Payer: COMMERCIAL

## 2024-01-30 NOTE — TELEPHONE ENCOUNTER
----- Message from Lizette Alvarenga MD sent at 1/30/2024  9:04 AM CST -----  Please give pt. results

## 2024-02-15 LAB — NONINV COLON CA DNA+OCC BLD SCRN STL QL: NORMAL

## 2024-03-13 ENCOUNTER — OFFICE VISIT (OUTPATIENT)
Dept: NEUROLOGY | Facility: CLINIC | Age: 54
End: 2024-03-13
Payer: COMMERCIAL

## 2024-03-13 VITALS
WEIGHT: 221 LBS | HEART RATE: 58 BPM | DIASTOLIC BLOOD PRESSURE: 96 MMHG | OXYGEN SATURATION: 96 % | BODY MASS INDEX: 32.73 KG/M2 | RESPIRATION RATE: 18 BRPM | SYSTOLIC BLOOD PRESSURE: 142 MMHG | HEIGHT: 69 IN

## 2024-03-13 DIAGNOSIS — E78.5 HYPERLIPIDEMIA, UNSPECIFIED HYPERLIPIDEMIA TYPE: ICD-10-CM

## 2024-03-13 DIAGNOSIS — I63.89 OTHER CEREBRAL INFARCTION: ICD-10-CM

## 2024-03-13 DIAGNOSIS — I63.9 CEREBROVASCULAR ACCIDENT (CVA), UNSPECIFIED MECHANISM: Primary | ICD-10-CM

## 2024-03-13 DIAGNOSIS — I10 PRIMARY HYPERTENSION: ICD-10-CM

## 2024-03-13 PROCEDURE — 3044F HG A1C LEVEL LT 7.0%: CPT | Mod: ,,, | Performed by: NURSE PRACTITIONER

## 2024-03-13 PROCEDURE — 3077F SYST BP >= 140 MM HG: CPT | Mod: ,,, | Performed by: NURSE PRACTITIONER

## 2024-03-13 PROCEDURE — 4010F ACE/ARB THERAPY RXD/TAKEN: CPT | Mod: ,,, | Performed by: NURSE PRACTITIONER

## 2024-03-13 PROCEDURE — 3008F BODY MASS INDEX DOCD: CPT | Mod: ,,, | Performed by: NURSE PRACTITIONER

## 2024-03-13 PROCEDURE — 1159F MED LIST DOCD IN RCRD: CPT | Mod: ,,, | Performed by: NURSE PRACTITIONER

## 2024-03-13 PROCEDURE — 3080F DIAST BP >= 90 MM HG: CPT | Mod: ,,, | Performed by: NURSE PRACTITIONER

## 2024-03-13 PROCEDURE — 1160F RVW MEDS BY RX/DR IN RCRD: CPT | Mod: ,,, | Performed by: NURSE PRACTITIONER

## 2024-03-13 PROCEDURE — 99213 OFFICE O/P EST LOW 20 MIN: CPT | Mod: S$PBB,,, | Performed by: NURSE PRACTITIONER

## 2024-03-13 PROCEDURE — 99215 OFFICE O/P EST HI 40 MIN: CPT | Mod: PBBFAC | Performed by: NURSE PRACTITIONER

## 2024-03-13 NOTE — PROGRESS NOTES
Subjective:       Patient ID: Zach Briscoe is a 53 y.o. male     Chief Complaint:    Chief Complaint   Patient presents with    Follow-up        Allergies:  Patient has no known allergies.    Current Medications:    Outpatient Encounter Medications as of 3/13/2024   Medication Sig Dispense Refill    amLODIPine (NORVASC) 10 MG tablet Take 1 tablet (10 mg total) by mouth once daily. 90 tablet 3    aspirin (ECOTRIN) 81 MG EC tablet Take 81 mg by mouth once daily.      atorvastatin (LIPITOR) 40 MG tablet Take 1 tablet (40 mg total) by mouth once daily. 90 tablet 3    benazepril-hydrochlorthiazide (LOTENSIN HCT) 20-25 mg Tab Take 1 tablet by mouth once daily. 90 tablet 3    multivitamin capsule Take 1 capsule by mouth once daily.      vit b comp & c-fa-copper-zinc 5-1.5-25 mg (FOLBEE PLUS) 5-1.5-25 mg Tab Take 1 tablet by mouth once daily. 30 each 11    azithromycin (Z-ATUL) 250 MG tablet Take 1 tablet (250 mg total) by mouth once daily. (Patient not taking: Reported on 3/13/2024) 10 tablet 0    FOLBEE PLUS 5 mg Tab Take 1 tablet by mouth.       No facility-administered encounter medications on file as of 3/13/2024.       History of Present Illness  54 y/o male following in neurology for TIA.  Last clinic visit was 11 months ago, missed prior scheduled follow-up with us.    At last visit was seen in the ED on 2/22/23, twice actually.  The first visit was for headache and dizziness.  He had CT head done which was not revealing.  He returned to the ED later that day with reported right sided numbness.  He has history of HTN.  He was actually prior followed here in neurology in 2017 after similar events in late 2016.  His workup then including MRI brain did not reflect any acute findings, but he was hypertensive then and not taking his medications.  He was started on ASA daily.  His other workup including CTA and MRA then indicated atherosclerosis, but non surgical.  He was apparently not taking the ASA any longer  when seen in the emergency department, but restarted it.  His LDL was noted 144, we set target of 70 and I started him on lipitor 40mg daily.  We repeated CTA head and neck which were essentially unchanged from prior done in 2017.   I had ordered an MRI brainbut at last visti 11 months ago he had not had it done, stated didn't want to but I encouraged him to do so given his sytmpoms.  It was finally done 5/30/23, and showed a sub-acute CVA left temporal and left occipital lobes.  Echo showed EF 55%.  I had contacted him with results, instructed him to take his medications as directed and to keep his follow-up with us which he promptly did not do and is only now returning to us.    I note in primary care encounter in January he had ran out of his medications.  Today he is reporting for about 3 months, having daily headache, but also having difficulty with weakness in his left arm, as well as family reporting having speech difficutly, using words incorrectly for about 3 months as well.  Known was non-compliant for a period of time so possibly another CVA is certainly in consideration.  He does have a slight pronator drift on LUE on exam, slight weakness    He was referred to cardiology by us in February of 2023, but it does not appear that anyone in the [facility] ever acted on it.  I am placing another one today due to his history of CVA but also his hypertension           Review of Systems  Review of Systems   Constitutional:  Negative for diaphoresis and fever.   HENT:  Negative for congestion, hearing loss and tinnitus.    Eyes:  Negative for blurred vision, double vision, photophobia, discharge and redness.   Respiratory:  Negative for cough and shortness of breath.    Cardiovascular:  Negative for chest pain.   Gastrointestinal:  Negative for abdominal pain, nausea and vomiting.   Musculoskeletal:  Negative for back pain, joint pain, myalgias and neck pain.   Skin:  Negative for itching and rash.   Neurological:   Positive for sensory change, weakness and headaches. Negative for dizziness, tremors, speech change, focal weakness, seizures and loss of consciousness.   Psychiatric/Behavioral:  Positive for memory loss. Negative for depression and hallucinations. The patient does not have insomnia.    All other systems reviewed and are negative.     Objective:     NEUROLOGICAL EXAMINATION:     MENTAL STATUS   Oriented to person, place, and time.   Attention: normal. Concentration: normal.   Speech: speech is normal   Level of consciousness: alert  Knowledge: good and consistent with education.   Normal comprehension.     CRANIAL NERVES     CN II   Visual fields full to confrontation.   Visual acuity: normal  Right visual field deficit: none  Left visual field deficit: none     CN III, IV, VI   Pupils are equal, round, and reactive to light.  Extraocular motions are normal.   Right pupil: Size: 3 mm. Shape: regular. Reactivity: brisk. Consensual response: intact. Accommodation: intact.   Left pupil: Size: 3 mm. Shape: regular. Reactivity: brisk. Consensual response: intact. Accommodation: intact.   CN III: no CN III palsy  CN VI: no CN VI palsy  Nystagmus: bilateral   Nystagmus type: rapid and horizontal  Diplopia: none  Upgaze: normal  Downgaze: normal  Conjugate gaze: present  Vestibulo-ocular reflex: present    CN V   Facial sensation intact.   Right facial sensation deficit: none  Left facial sensation deficit: none  Right corneal reflex: normal  Left corneal reflex: normal    CN VII   Facial expression full, symmetric.   Right facial weakness: none  Left facial weakness: none  Right taste: normal  Left taste: normal    CN VIII   CN VIII normal.   Hearing: intact  Right Rinne: AC > BC  Left Rinne: AC > BC    CN IX, X   CN IX normal.   CN X normal.   Palate: symmetric    CN XI   CN XI normal.   Right sternocleidomastoid strength: normal  Left sternocleidomastoid strength: normal  Right trapezius strength: normal  Left trapezius  strength: normal    CN XII   CN XII normal.   Tongue: not atrophic  Fasciculations: absent  Tongue deviation: none       Nystagmus is long standing since birth     MOTOR EXAM   Muscle bulk: normal  Overall muscle tone: normal  Right arm tone: normal  Left arm tone: normal  Right arm pronator drift: absent  Left arm pronator drift: present  Right leg tone: normal  Left leg tone: normal    Strength   Right neck flexion: 5/5  Left neck flexion: 5/5  Right neck extension: 5/5  Left neck extension: 5/5  Right deltoid: 5/5  Left deltoid: 5/5  Right biceps: 5/5  Left biceps: 4/5  Right triceps: 5/5  Left triceps: 4/5  Right wrist flexion: 5/5  Left wrist flexion: 5/5  Right wrist extension: 5/5  Left wrist extension: 5/5  Right interossei: 5/5  Left interossei: 5/5  Right iliopsoas: 5/5  Left iliopsoas: 5/5  Right quadriceps: 5/5  Left quadriceps: 5/5  Right hamstrin/5  Left hamstrin/5  Right anterior tibial: 5/5  Left anterior tibial: 5/5  Right posterior tibial: 5/5  Left posterior tibial: 5/5  Right gastroc: 5/5  Left gastroc: 5/5    REFLEXES     Reflexes   Right brachioradialis: 2+  Left brachioradialis: 2+  Right biceps: 2+  Left biceps: 2+  Right triceps: 2+  Left triceps: 2+  Right patellar: 2+  Left patellar: 2+  Right achilles: 2+  Left achilles: 2+  Right plantar: normal  Left plantar: normal  Right Wong: absent  Left Wong: absent  Right ankle clonus: absent  Left ankle clonus: absent  Right pendular knee jerk: absent  Left pendular knee jerk: absent    SENSORY EXAM   Light touch normal.   Right arm light touch: normal  Left arm light touch: normal  Right leg light touch: normal  Left leg light touch: normal  Vibration normal.   Right arm vibration: normal  Left arm vibration: normal  Right leg vibration: normal  Left leg vibration: normal  Proprioception normal.   Right arm proprioception: normal  Left arm proprioception: normal  Right leg proprioception: normal  Left leg proprioception:  normal  Pinprick normal.   Right arm pinprick: normal  Left arm pinprick: normal  Right leg pinprick: normal  Left leg pinprick: normal  Graphesthesia: normal  Romberg: negative  Stereognosis: normal    GAIT AND COORDINATION     Gait  Gait: normal     Coordination   Finger to nose coordination: normal  Heel to shin coordination: normal  Tandem walking coordination: normal    Tremor   Resting tremor: absent  Intention tremor: absent  Action tremor: absent       Physical Exam  Vitals and nursing note reviewed.   Constitutional:       Appearance: Normal appearance.   HENT:      Head: Normocephalic.      Ears:      Right Rinne: AC > BC.     Left Rinne: AC > BC.  Eyes:      Extraocular Movements: EOM normal.      Pupils: Pupils are equal, round, and reactive to light.   Cardiovascular:      Rate and Rhythm: Normal rate and regular rhythm.      Pulses: Normal pulses.      Heart sounds: Normal heart sounds.   Pulmonary:      Effort: Pulmonary effort is normal.      Breath sounds: Normal breath sounds.   Musculoskeletal:         General: Normal range of motion.      Cervical back: Normal range of motion and neck supple.   Skin:     General: Skin is warm and dry.   Neurological:      General: No focal deficit present.      Mental Status: He is alert and oriented to person, place, and time.      Cranial Nerves: No cranial nerve deficit.      Sensory: Sensory deficit present.      Motor: Weakness present.      Coordination: Coordination normal. Finger-Nose-Finger Test, Heel to Shin Test and Romberg Test normal.      Gait: Gait is intact. Gait and tandem walk normal.      Deep Tendon Reflexes: Reflexes normal.      Reflex Scores:       Tricep reflexes are 2+ on the right side and 2+ on the left side.       Bicep reflexes are 2+ on the right side and 2+ on the left side.       Brachioradialis reflexes are 2+ on the right side and 2+ on the left side.       Patellar reflexes are 2+ on the right side and 2+ on the left side.        Achilles reflexes are 2+ on the right side and 2+ on the left side.  Psychiatric:         Mood and Affect: Mood normal.         Speech: Speech normal.         Behavior: Behavior normal.          Assessment:     Problem List Items Addressed This Visit          Neuro    Cerebrovascular accident (CVA) - Primary       Cardiac/Vascular    Primary hypertension    Relevant Orders    Ambulatory referral/consult to Cardiology    Hyperlipidemia     Other Visit Diagnoses       Other cerebral infarction        Relevant Orders    MRI Brain W WO Contrast    Basic Metabolic Panel    Ambulatory referral/consult to Cardiology                 Primary Diagnosis and ICD10  Cerebrovascular accident (CVA), unspecified mechanism [I63.9]    Plan:     Patient Instructions   Continue current medications as directed  MRI brain w/wo  Follow-up 4 weeks  Refer to cardiology, poorly managed hypertension    Stroke risk modifiers:    1. Good sleep habits, recommend at least 7 hours total sleep daily  2. Continue management of blood pressure and cholesterol including medications, exercise, and good eating habits  3. Exercise as much as possible, recommend 5 days of the week for 30 minutes each day  4. Avoid smoking and alcohol  5. Go to the nearest emergency department immediately if any new or worsening weakness, speech difficulty, or numbness     There are no discontinued medications.      Requested Prescriptions      No prescriptions requested or ordered in this encounter       Orders Placed This Encounter   Procedures    MRI Brain W WO Contrast    Basic Metabolic Panel    Ambulatory referral/consult to Cardiology

## 2024-03-13 NOTE — PATIENT INSTRUCTIONS
Continue current medications as directed  MRI brain w/wo  Follow-up 4 weeks  Refer to cardiology, poorly managed hypertension    Stroke risk modifiers:    1. Good sleep habits, recommend at least 7 hours total sleep daily  2. Continue management of blood pressure and cholesterol including medications, exercise, and good eating habits  3. Exercise as much as possible, recommend 5 days of the week for 30 minutes each day  4. Avoid smoking and alcohol  5. Go to the nearest emergency department immediately if any new or worsening weakness, speech difficulty, or numbness

## 2024-04-02 ENCOUNTER — TELEPHONE (OUTPATIENT)
Dept: NEUROLOGY | Facility: CLINIC | Age: 54
End: 2024-04-02
Payer: COMMERCIAL

## 2024-04-02 ENCOUNTER — HOSPITAL ENCOUNTER (OUTPATIENT)
Dept: RADIOLOGY | Facility: HOSPITAL | Age: 54
Discharge: HOME | End: 2024-04-02
Attending: NURSE PRACTITIONER
Payer: COMMERCIAL

## 2024-04-02 DIAGNOSIS — I63.89 OTHER CEREBRAL INFARCTION: ICD-10-CM

## 2024-04-02 PROCEDURE — A9577 INJ MULTIHANCE: HCPCS | Performed by: NURSE PRACTITIONER

## 2024-04-02 PROCEDURE — 25500020 PHARM REV CODE 255: Performed by: NURSE PRACTITIONER

## 2024-04-02 PROCEDURE — 70553 MRI BRAIN STEM W/O & W/DYE: CPT | Mod: 26,,, | Performed by: STUDENT IN AN ORGANIZED HEALTH CARE EDUCATION/TRAINING PROGRAM

## 2024-04-02 PROCEDURE — 70553 MRI BRAIN STEM W/O & W/DYE: CPT | Mod: TC

## 2024-04-02 RX ADMIN — GADOBENATE DIMEGLUMINE 20 ML: 529 INJECTION, SOLUTION INTRAVENOUS at 08:04

## 2024-04-02 NOTE — TELEPHONE ENCOUNTER
Called pt and gave him the onformation below from [facility]. I renforce that he takes his medication as directed. He v/u.          ----- Message from DORETHA Avila sent at 4/2/2024  9:27 AM CDT -----  Looks like a new stroke has happened in the left parietal lobe, likely during the 3 months he was not taking his medications as directed.  This is more insistence on him taking his medications as directed as clearly when he doesn't there is complication.

## 2024-04-14 DIAGNOSIS — E53.8 VITAMIN B12 DEFICIENCY: ICD-10-CM

## 2024-04-15 RX ORDER — FOLIC ACID/VIT BCOMP,C/CU/ZINC 5-1.5-25MG
1 TABLET ORAL DAILY
Qty: 30 EACH | Refills: 11 | Status: SHIPPED | OUTPATIENT
Start: 2024-04-15

## 2024-06-17 PROBLEM — I63.9 CEREBROVASCULAR ACCIDENT (CVA): Status: RESOLVED | Noted: 2024-03-13 | Resolved: 2024-06-17

## 2024-06-28 ENCOUNTER — OFFICE VISIT (OUTPATIENT)
Dept: CARDIOLOGY | Facility: CLINIC | Age: 54
End: 2024-06-28
Payer: COMMERCIAL

## 2024-06-28 VITALS
DIASTOLIC BLOOD PRESSURE: 90 MMHG | BODY MASS INDEX: 32.58 KG/M2 | SYSTOLIC BLOOD PRESSURE: 140 MMHG | OXYGEN SATURATION: 100 % | WEIGHT: 220 LBS | HEART RATE: 63 BPM | HEIGHT: 69 IN

## 2024-06-28 DIAGNOSIS — I63.9 RECURRENT CEREBROVASCULAR ACCIDENTS (CVAS): Primary | ICD-10-CM

## 2024-06-28 DIAGNOSIS — I63.89 OTHER CEREBRAL INFARCTION: ICD-10-CM

## 2024-06-28 DIAGNOSIS — I35.0 NONRHEUMATIC AORTIC VALVE STENOSIS: ICD-10-CM

## 2024-06-28 DIAGNOSIS — I10 PRIMARY HYPERTENSION: ICD-10-CM

## 2024-06-28 DIAGNOSIS — R07.9 CHEST PAIN, UNSPECIFIED TYPE: ICD-10-CM

## 2024-06-28 PROCEDURE — 4010F ACE/ARB THERAPY RXD/TAKEN: CPT | Mod: ,,, | Performed by: STUDENT IN AN ORGANIZED HEALTH CARE EDUCATION/TRAINING PROGRAM

## 2024-06-28 PROCEDURE — 3077F SYST BP >= 140 MM HG: CPT | Mod: ,,, | Performed by: STUDENT IN AN ORGANIZED HEALTH CARE EDUCATION/TRAINING PROGRAM

## 2024-06-28 PROCEDURE — 3080F DIAST BP >= 90 MM HG: CPT | Mod: ,,, | Performed by: STUDENT IN AN ORGANIZED HEALTH CARE EDUCATION/TRAINING PROGRAM

## 2024-06-28 PROCEDURE — 3008F BODY MASS INDEX DOCD: CPT | Mod: ,,, | Performed by: STUDENT IN AN ORGANIZED HEALTH CARE EDUCATION/TRAINING PROGRAM

## 2024-06-28 PROCEDURE — 99215 OFFICE O/P EST HI 40 MIN: CPT | Mod: PBBFAC | Performed by: STUDENT IN AN ORGANIZED HEALTH CARE EDUCATION/TRAINING PROGRAM

## 2024-06-28 PROCEDURE — 3044F HG A1C LEVEL LT 7.0%: CPT | Mod: ,,, | Performed by: STUDENT IN AN ORGANIZED HEALTH CARE EDUCATION/TRAINING PROGRAM

## 2024-06-28 PROCEDURE — 1159F MED LIST DOCD IN RCRD: CPT | Mod: ,,, | Performed by: STUDENT IN AN ORGANIZED HEALTH CARE EDUCATION/TRAINING PROGRAM

## 2024-06-28 PROCEDURE — 99999 PR PBB SHADOW E&M-EST. PATIENT-LVL V: CPT | Mod: PBBFAC,,, | Performed by: STUDENT IN AN ORGANIZED HEALTH CARE EDUCATION/TRAINING PROGRAM

## 2024-06-28 PROCEDURE — 99204 OFFICE O/P NEW MOD 45 MIN: CPT | Mod: S$PBB,,, | Performed by: STUDENT IN AN ORGANIZED HEALTH CARE EDUCATION/TRAINING PROGRAM

## 2024-06-28 RX ORDER — BENAZEPRIL HYDROCHLORIDE 20 MG/1
20 TABLET ORAL DAILY
Qty: 90 TABLET | Refills: 3 | Status: SHIPPED | OUTPATIENT
Start: 2024-06-28 | End: 2025-06-28

## 2024-06-28 RX ORDER — NITROGLYCERIN 0.4 MG/1
0.4 TABLET SUBLINGUAL EVERY 5 MIN PRN
Qty: 30 TABLET | Refills: 0 | Status: SHIPPED | OUTPATIENT
Start: 2024-06-28

## 2024-06-28 NOTE — PROGRESS NOTES
PCP: Lizette Alvarenga MD    Referring Provider: Topher Patel, FNP  [address] [place] [address] [place] [phone]    Reason for Referral: CVA, HTN    Subjective:   Zach Briscoe is a 54 y.o. male with hx of recurrent CVAs/TIAs (no residual neuro deficits), hypertension and hyperlipidemia who presents for a new patient visit.     Referred to Cardiology due to recurrent CVA/TIAs.  April 2024 MRI with chronic infarct in the left parietal lobe (new compared to 05/26/23 MRI- likely happened during last 3 months) and small chronic infarcts in the left parietal lobe and chronic infarct in the left temporal lobe.  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 Duration is variable -> can last for hours.  No exacerbating or relieving factors noted.  Last echo from 04/04/2023 with mild-to-moderate aortic valve stenosis, normal biventricular function.  Denies palpitations, dyspnea, lightheadedness or syncopal episodes.    Fhx:  Mother (hypertension)  Shx:  Never smoker    EKG 06/28/2024:  Sinus bradycardia, right bundle-branch block   02/22/2023:  Normal sinus rhythm, right bundle-branch block    ECHO Results for orders placed during the hospital encounter of 04/04/23    Echo Saline Bubble? Yes    Interpretation Summary  · There is no evidence of intracardiac shunting.  · The left ventricle is normal in size with mild concentric hypertrophy and normal systolic function.  · The estimated ejection fraction is 55%.  · Normal left ventricular diastolic function.  · Atrial fibrillation not observed.  · Normal right ventricular size.  · Aortic valve not well visualized on parasternal short axis view. Probable bileaflet aortic valve.  · There is mild-to-moderate aortic valve stenosis.  · Aortic valve area is 1.43 cm2; peak velocity is 2.9 m/s; mean gradient is 15 mmHg.  · Mild aortic regurgitation.  · Mild  tricuspid regurgitation.  · Normal central venous pressure (3 mmHg).  · The estimated PA systolic pressure is 34 mmHg.     CATH: No results found for this or any previous visit.     Lab Results   Component Value Date     06/28/2024    K 3.8 06/28/2024     (H) 06/28/2024    CO2 28 06/28/2024    BUN 18 06/28/2024    CREATININE 1.46 (H) 06/28/2024    CALCIUM 10.4 (H) 06/28/2024    ANIONGAP 9 06/28/2024    ESTGFRAFRICA 75 11/16/2021    EGFRNONAA 49 (L) 04/25/2022       Lab Results   Component Value Date    CHOL 250 (H) 01/29/2024    CHOL 208 (H) 02/02/2023    CHOL 195 11/16/2021     Lab Results   Component Value Date    HDL 41 01/29/2024    HDL 41 02/02/2023    HDL 34 (L) 11/16/2021     Lab Results   Component Value Date    LDLCALC 160 01/29/2024    LDLCALC 144 02/02/2023    LDLCALC 135 11/16/2021     Lab Results   Component Value Date    TRIG 247 (H) 01/29/2024    TRIG 117 02/02/2023    TRIG 128 11/16/2021     Lab Results   Component Value Date    CHOLHDL 6.1 01/29/2024    CHOLHDL 5.1 02/02/2023    CHOLHDL 5.7 11/16/2021       Lab Results   Component Value Date    WBC 5.95 01/29/2024    HGB 15.0 01/29/2024    HCT 44.4 01/29/2024    MCV 89.2 01/29/2024     01/29/2024           Current Outpatient Medications:     amLODIPine (NORVASC) 10 MG tablet, Take 1 tablet (10 mg total) by mouth once daily., Disp: 90 tablet, Rfl: 3    aspirin (ECOTRIN) 81 MG EC tablet, Take 81 mg by mouth once daily., Disp: , Rfl:     atorvastatin (LIPITOR) 40 MG tablet, Take 1 tablet (40 mg total) by mouth once daily., Disp: 90 tablet, Rfl: 3    benazepril-hydrochlorthiazide (LOTENSIN HCT) 20-25 mg Tab, Take 1 tablet by mouth once daily., Disp: 90 tablet, Rfl: 3    FOLBEE PLUS 5 mg Tab, Take 1 tablet by mouth., Disp: , Rfl:     multivitamin capsule, Take 1 capsule by mouth once daily., Disp: , Rfl:     azithromycin (Z-ATUL) 250 MG tablet, Take 1 tablet (250 mg total) by mouth once daily., Disp: 10 tablet, Rfl: 0    benazepriL  "(LOTENSIN) 20 MG tablet, Take 1 tablet (20 mg total) by mouth once daily., Disp: 90 tablet, Rfl: 3    nitroGLYCERIN (NITROSTAT) 0.4 MG SL tablet, Place 1 tablet (0.4 mg total) under the tongue every 5 (five) minutes as needed for Chest pain (x 3 doses. Please go to the ER if chest pain persits after 3rd dose of nitroglycerin)., Disp: 30 tablet, Rfl: 0    vit b comp & c-fa-copper-zinc 5-1.5-25 mg (FOLBEE PLUS) 5-1.5-25 mg Tab, Take 1 tablet by mouth once daily. (Patient not taking: Reported on 6/28/2024), Disp: 30 each, Rfl: 11    Review of Systems   Constitutional:  Negative for chills, diaphoresis, fever and malaise/fatigue.   Respiratory:  Negative for cough and shortness of breath.    Cardiovascular:  Positive for chest pain. Negative for palpitations, orthopnea, claudication, leg swelling and PND.   Gastrointestinal:  Negative for abdominal pain, heartburn, nausea and vomiting.   Neurological:  Negative for dizziness.       Objective:   BP (!) 140/90 (BP Location: Right arm, Patient Position: Sitting)   Pulse 63   Ht 5' 9" (1.753 m)   Wt 99.8 kg (220 lb)   SpO2 100%   BMI 32.49 kg/m²     Physical Exam  Constitutional:       General: He is not in acute distress.     Appearance: Normal appearance.   Cardiovascular:      Rate and Rhythm: Normal rate and regular rhythm.      Pulses: Normal pulses.      Heart sounds: Normal heart sounds. No murmur heard.     No friction rub. No gallop.   Pulmonary:      Effort: Pulmonary effort is normal.      Breath sounds: Normal breath sounds. No wheezing or rales.   Musculoskeletal:      Right lower leg: No edema.      Left lower leg: No edema.   Skin:     General: Skin is warm and dry.   Neurological:      Mental Status: He is alert.           Assessment:     1. Recurrent cerebrovascular accidents (CVAs)  Echo    30 Day Outpatient Telemetry    Basic Metabolic Panel      2. Primary hypertension  EKG 12-lead    Ambulatory referral/consult to Cardiology    Basic Metabolic " Panel    EKG 12-lead      3. Other cerebral infarction  Ambulatory referral/consult to Cardiology      4. Chest pain, unspecified type  Echo    NM Myocardial Perfusion Spect Multi Exer    Nuclear Stress Test      5. Nonrheumatic aortic valve stenosis  Echo            Plan:   No problem-specific Assessment & Plan notes found for this encounter.    Typical chest pain  Chest pressure 1-2 times/month, usually with exertion for the last 6 months   Risk factors:  Hypertension, hyperlipidemia and prior recurrent CVAs  Intermediate pretest probability of CAD  - Schedule exercise treadmill test with nuclear imaging  - Sublingual nitroglycerin as needed for chest pain prescribed and instructions for use discussed in detail   - continue aspirin 81 mg daily and atorvastatin 40 mg daily  - continue amlodipine 10 mg daily    Recurrent CVAs/TIAs  - 30-day ambulatory cardiac monitor (MCOT) to be mailed in (to assess for paroxysmal atrial fibrillation)    Mild-to-moderate AS  PAUL 1.45 cm2, peak velocity 2.9 m/s, mean gradient 15 mm Hg over a year ago (4/4/3)  Possible bileaflet aortic valve-> poorly visualized on PSAX  - Schedule TTE for serial assessment of mild-to-moderate AS and improved visualization of aortic valve on short axis views to determine if this is a bileaflet valve.  Moreover, patient has not had a repeat echo since recent CVA.    Hypertension  Blood pressure above goal 140/90.  Notes blood pressure is uncontrolled and home as well with a SBP ranging from 140 to 160s  - repeat BMP today  - start benazepril 20 mg daily. .  Take in addition to benazepril-HCTZ 20-25 mg daily for total benazepril dose of 40 mg daily    - Follow-up in 6-8 weeks

## 2024-06-28 NOTE — PATIENT INSTRUCTIONS
- Place 1 tablet (0.4 mg total) under the tongue every 5 (five) minutes as needed for Chest pain (x 3 doses. Please go to the ER if chest pain persits after 3rd dose of nitroglycerin).  - Echo and stress test scheduled on ->July 25, 2024 at 8:00 am  - Heart monitor to be mailed in   - Labs today  - Follow-up in 6-8 weeks

## 2024-07-22 ENCOUNTER — OFFICE VISIT (OUTPATIENT)
Dept: NEUROLOGY | Facility: CLINIC | Age: 54
End: 2024-07-22
Payer: COMMERCIAL

## 2024-07-22 VITALS
SYSTOLIC BLOOD PRESSURE: 131 MMHG | RESPIRATION RATE: 18 BRPM | HEART RATE: 84 BPM | OXYGEN SATURATION: 98 % | BODY MASS INDEX: 32.44 KG/M2 | WEIGHT: 219 LBS | DIASTOLIC BLOOD PRESSURE: 78 MMHG | HEIGHT: 69 IN

## 2024-07-22 DIAGNOSIS — I10 PRIMARY HYPERTENSION: ICD-10-CM

## 2024-07-22 DIAGNOSIS — E78.5 HYPERLIPIDEMIA, UNSPECIFIED HYPERLIPIDEMIA TYPE: ICD-10-CM

## 2024-07-22 DIAGNOSIS — I63.89 OTHER CEREBRAL INFARCTION: Primary | ICD-10-CM

## 2024-07-22 PROCEDURE — 1159F MED LIST DOCD IN RCRD: CPT | Mod: ,,, | Performed by: NURSE PRACTITIONER

## 2024-07-22 PROCEDURE — 1160F RVW MEDS BY RX/DR IN RCRD: CPT | Mod: ,,, | Performed by: NURSE PRACTITIONER

## 2024-07-22 PROCEDURE — 3008F BODY MASS INDEX DOCD: CPT | Mod: ,,, | Performed by: NURSE PRACTITIONER

## 2024-07-22 PROCEDURE — 99214 OFFICE O/P EST MOD 30 MIN: CPT | Mod: PBBFAC | Performed by: NURSE PRACTITIONER

## 2024-07-22 PROCEDURE — 3078F DIAST BP <80 MM HG: CPT | Mod: ,,, | Performed by: NURSE PRACTITIONER

## 2024-07-22 PROCEDURE — 99213 OFFICE O/P EST LOW 20 MIN: CPT | Mod: S$PBB,,, | Performed by: NURSE PRACTITIONER

## 2024-07-22 PROCEDURE — 99999 PR PBB SHADOW E&M-EST. PATIENT-LVL IV: CPT | Mod: PBBFAC,,, | Performed by: NURSE PRACTITIONER

## 2024-07-22 PROCEDURE — 3075F SYST BP GE 130 - 139MM HG: CPT | Mod: ,,, | Performed by: NURSE PRACTITIONER

## 2024-07-22 PROCEDURE — 4010F ACE/ARB THERAPY RXD/TAKEN: CPT | Mod: ,,, | Performed by: NURSE PRACTITIONER

## 2024-07-22 PROCEDURE — 3044F HG A1C LEVEL LT 7.0%: CPT | Mod: ,,, | Performed by: NURSE PRACTITIONER

## 2024-07-22 NOTE — PROGRESS NOTES
Subjective:       Patient ID: Zach Briscoe is a 54 y.o. male     Chief Complaint:    No chief complaint on file.       Allergies:  Patient has no known allergies.    Current Medications:    Outpatient Encounter Medications as of 7/22/2024   Medication Sig Dispense Refill    amLODIPine (NORVASC) 10 MG tablet Take 1 tablet (10 mg total) by mouth once daily. 90 tablet 3    aspirin (ECOTRIN) 81 MG EC tablet Take 81 mg by mouth once daily.      atorvastatin (LIPITOR) 40 MG tablet Take 1 tablet (40 mg total) by mouth once daily. 90 tablet 3    benazepriL (LOTENSIN) 20 MG tablet Take 1 tablet (20 mg total) by mouth once daily. 90 tablet 3    benazepril-hydrochlorthiazide (LOTENSIN HCT) 20-25 mg Tab Take 1 tablet by mouth once daily. 90 tablet 3    FOLBEE PLUS 5 mg Tab Take 1 tablet by mouth.      multivitamin capsule Take 1 capsule by mouth once daily.      nitroGLYCERIN (NITROSTAT) 0.4 MG SL tablet Place 1 tablet (0.4 mg total) under the tongue every 5 (five) minutes as needed for Chest pain (x 3 doses. Please go to the ER if chest pain persits after 3rd dose of nitroglycerin). 30 tablet 0    vit b comp & c-fa-copper-zinc 5-1.5-25 mg (FOLBEE PLUS) 5-1.5-25 mg Tab Take 1 tablet by mouth once daily. 30 each 11    [DISCONTINUED] azithromycin (Z-ATUL) 250 MG tablet Take 1 tablet (250 mg total) by mouth once daily. 10 tablet 0     No facility-administered encounter medications on file as of 7/22/2024.       History of Present Illness  53 y/o male following in neurology for history of CVA.  Last clinic visit 4 months prior, he had scheduled appt to see us in follow-up 4/16/24 and did not keep the follow-up.  Prior to last visit 4 months ago it had been 11 months since prior visit.    Prior MRI brain done 5/3023 had shown a sub-acute CVA left temporal and left occipital lobes.  He was told to take his ASA, statin, and HTN medication as directed.  Had already been told this in 2017 when prior followed by us but  apparently had stopped it and not kept follow-up with us.  He then did not return to the clinic for 11 months, cancelling his scheduled follow-up with us.  Notes in the EMR indicated he had run out of his medications.  He was seen by us in March of 2024 with reported worsening in left arm weakness and speech difficulty for about 3 months, onset during the time he was out of all his medications.  I obtained another MRI brain 4/2/24, which questioned new CVA left parietal lobe.    He was seen by Dr. Chung in cardiology in late June of 2024, this was referral from us due to the CVA for evaluation to r/o cardiac related reason for CVA, though non-compliance with treatment plan is likely main source.             Review of Systems  Review of Systems   Constitutional:  Negative for diaphoresis and fever.   HENT:  Negative for congestion, hearing loss and tinnitus.    Eyes:  Negative for blurred vision, double vision, photophobia, discharge and redness.   Respiratory:  Negative for cough and shortness of breath.    Cardiovascular:  Negative for chest pain.   Gastrointestinal:  Negative for abdominal pain, nausea and vomiting.   Musculoskeletal:  Negative for back pain, joint pain, myalgias and neck pain.   Skin:  Negative for itching and rash.   Neurological:  Positive for sensory change, weakness and headaches. Negative for dizziness, tremors, speech change, focal weakness, seizures and loss of consciousness.   Psychiatric/Behavioral:  Positive for memory loss. Negative for depression and hallucinations. The patient does not have insomnia.    All other systems reviewed and are negative.     Objective:     NEUROLOGICAL EXAMINATION:     MENTAL STATUS   Oriented to person, place, and time.   Attention: normal. Concentration: normal.   Speech: speech is normal   Level of consciousness: alert  Knowledge: good and consistent with education.   Normal comprehension.     CRANIAL NERVES     CN II   Visual fields full to  confrontation.   Visual acuity: normal  Right visual field deficit: none  Left visual field deficit: none     CN III, IV, VI   Pupils are equal, round, and reactive to light.  Extraocular motions are normal.   Right pupil: Size: 3 mm. Shape: regular. Reactivity: brisk. Consensual response: intact. Accommodation: intact.   Left pupil: Size: 3 mm. Shape: regular. Reactivity: brisk. Consensual response: intact. Accommodation: intact.   CN III: no CN III palsy  CN VI: no CN VI palsy  Nystagmus: bilateral   Nystagmus type: rapid and horizontal  Diplopia: none  Upgaze: normal  Downgaze: normal  Conjugate gaze: present  Vestibulo-ocular reflex: present    CN V   Facial sensation intact.   Right facial sensation deficit: none  Left facial sensation deficit: none  Right corneal reflex: normal  Left corneal reflex: normal    CN VII   Facial expression full, symmetric.   Right facial weakness: none  Left facial weakness: none  Right taste: normal  Left taste: normal    CN VIII   CN VIII normal.   Hearing: intact  Right Rinne: AC > BC  Left Rinne: AC > BC    CN IX, X   CN IX normal.   CN X normal.   Palate: symmetric    CN XI   CN XI normal.   Right sternocleidomastoid strength: normal  Left sternocleidomastoid strength: normal  Right trapezius strength: normal  Left trapezius strength: normal    CN XII   CN XII normal.   Tongue: not atrophic  Fasciculations: absent  Tongue deviation: none       Nystagmus is long standing since birth     MOTOR EXAM   Muscle bulk: normal  Overall muscle tone: normal  Right arm tone: normal  Left arm tone: normal  Right arm pronator drift: absent  Left arm pronator drift: present  Right leg tone: normal  Left leg tone: normal    Strength   Right neck flexion: 5/5  Left neck flexion: 5/5  Right neck extension: 5/5  Left neck extension: 5/5  Right deltoid: 5/5  Left deltoid: 5/5  Right biceps: 5/5  Left biceps: 4/5  Right triceps: 5/5  Left triceps: 4/5  Right wrist flexion: 5/5  Left wrist  flexion: 5/5  Right wrist extension: 5/5  Left wrist extension: 5/5  Right interossei: 5/5  Left interossei: 5/5  Right iliopsoas: 5/5  Left iliopsoas: 5/5  Right quadriceps: 5/5  Left quadriceps: 5/5  Right hamstrin/5  Left hamstrin/5  Right anterior tibial: 5/5  Left anterior tibial: 5/5  Right posterior tibial: 5/5  Left posterior tibial: 5/5  Right gastroc: 5/5  Left gastroc: 5/5    REFLEXES     Reflexes   Right brachioradialis: 2+  Left brachioradialis: 2+  Right biceps: 2+  Left biceps: 2+  Right triceps: 2+  Left triceps: 2+  Right patellar: 2+  Left patellar: 2+  Right achilles: 2+  Left achilles: 2+  Right plantar: normal  Left plantar: normal  Right Wong: absent  Left Wong: absent  Right ankle clonus: absent  Left ankle clonus: absent  Right pendular knee jerk: absent  Left pendular knee jerk: absent    SENSORY EXAM   Light touch normal.   Right arm light touch: normal  Left arm light touch: normal  Right leg light touch: normal  Left leg light touch: normal  Vibration normal.   Right arm vibration: normal  Left arm vibration: normal  Right leg vibration: normal  Left leg vibration: normal  Proprioception normal.   Right arm proprioception: normal  Left arm proprioception: normal  Right leg proprioception: normal  Left leg proprioception: normal  Pinprick normal.   Right arm pinprick: normal  Left arm pinprick: normal  Right leg pinprick: normal  Left leg pinprick: normal  Graphesthesia: normal  Romberg: negative  Stereognosis: normal    GAIT AND COORDINATION     Gait  Gait: normal     Coordination   Finger to nose coordination: normal  Heel to shin coordination: normal  Tandem walking coordination: normal    Tremor   Resting tremor: absent  Intention tremor: absent  Action tremor: absent       Physical Exam  Vitals and nursing note reviewed.   Constitutional:       Appearance: Normal appearance.   HENT:      Head: Normocephalic.      Ears:      Right Rinne: AC > BC.     Left Rinne: AC >  BC.  Eyes:      Extraocular Movements: EOM normal.      Pupils: Pupils are equal, round, and reactive to light.   Cardiovascular:      Rate and Rhythm: Normal rate and regular rhythm.      Pulses: Normal pulses.      Heart sounds: Normal heart sounds.   Pulmonary:      Effort: Pulmonary effort is normal.      Breath sounds: Normal breath sounds.   Musculoskeletal:         General: Normal range of motion.      Cervical back: Normal range of motion and neck supple.   Skin:     General: Skin is warm and dry.   Neurological:      General: No focal deficit present.      Mental Status: He is alert and oriented to person, place, and time.      Cranial Nerves: No cranial nerve deficit.      Sensory: Sensory deficit present.      Motor: Weakness present.      Coordination: Coordination normal. Finger-Nose-Finger Test, Heel to Shin Test and Romberg Test normal.      Gait: Gait is intact. Gait and tandem walk normal.      Deep Tendon Reflexes: Reflexes normal.      Reflex Scores:       Tricep reflexes are 2+ on the right side and 2+ on the left side.       Bicep reflexes are 2+ on the right side and 2+ on the left side.       Brachioradialis reflexes are 2+ on the right side and 2+ on the left side.       Patellar reflexes are 2+ on the right side and 2+ on the left side.       Achilles reflexes are 2+ on the right side and 2+ on the left side.  Psychiatric:         Mood and Affect: Mood normal.         Speech: Speech normal.         Behavior: Behavior normal.          Assessment:     Problem List Items Addressed This Visit          Neuro    Other cerebral infarction - Primary       Cardiac/Vascular    Primary hypertension    Hyperlipidemia              Primary Diagnosis and ICD10  Other cerebral infarction [I63.89]    Plan:     Patient Instructions   Continue current medications as directed  Keep follow-up with cardiology  Follow-up 3 months    Stroke risk modifiers:    1. Good sleep habits, recommend at least 7 hours total  sleep daily  2. Continue management of blood pressure and cholesterol including medications, exercise, and good eating habits  3. Exercise as much as possible, recommend 5 days of the week for 30 minutes each day  4. Avoid smoking and alcohol  5. Go to the nearest emergency department immediately if any new or worsening weakness, speech difficulty, or numbness     There are no discontinued medications.      Requested Prescriptions      No prescriptions requested or ordered in this encounter       No orders of the defined types were placed in this encounter.

## 2024-07-22 NOTE — PATIENT INSTRUCTIONS
Continue current medications as directed  Keep follow-up with cardiology  Follow-up 3 months    Stroke risk modifiers:    1. Good sleep habits, recommend at least 7 hours total sleep daily  2. Continue management of blood pressure and cholesterol including medications, exercise, and good eating habits  3. Exercise as much as possible, recommend 5 days of the week for 30 minutes each day  4. Avoid smoking and alcohol  5. Go to the nearest emergency department immediately if any new or worsening weakness, speech difficulty, or numbness

## 2024-07-25 ENCOUNTER — HOSPITAL ENCOUNTER (OUTPATIENT)
Dept: RADIOLOGY | Facility: HOSPITAL | Age: 54
Discharge: HOME | End: 2024-07-25
Attending: STUDENT IN AN ORGANIZED HEALTH CARE EDUCATION/TRAINING PROGRAM
Payer: COMMERCIAL

## 2024-07-25 ENCOUNTER — HOSPITAL ENCOUNTER (OUTPATIENT)
Dept: CARDIOLOGY | Facility: HOSPITAL | Age: 54
Discharge: HOME | End: 2024-07-25
Attending: STUDENT IN AN ORGANIZED HEALTH CARE EDUCATION/TRAINING PROGRAM
Payer: COMMERCIAL

## 2024-07-25 VITALS
DIASTOLIC BLOOD PRESSURE: 84 MMHG | HEIGHT: 69 IN | HEART RATE: 58 BPM | SYSTOLIC BLOOD PRESSURE: 157 MMHG | BODY MASS INDEX: 32.34 KG/M2

## 2024-07-25 DIAGNOSIS — I63.9 RECURRENT CEREBROVASCULAR ACCIDENTS (CVAS): ICD-10-CM

## 2024-07-25 DIAGNOSIS — I35.0 NONRHEUMATIC AORTIC VALVE STENOSIS: ICD-10-CM

## 2024-07-25 DIAGNOSIS — R07.9 CHEST PAIN, UNSPECIFIED TYPE: ICD-10-CM

## 2024-07-25 LAB
AORTIC ROOT ANNULUS: 2.22 CM
AORTIC VALVE CUSP SEPERATION: 1.95 CM
APICAL FOUR CHAMBER EJECTION FRACTION: 64 %
AV INDEX (PROSTH): 0.51
AV MEAN GRADIENT: 15 MMHG
AV PEAK GRADIENT: 27 MMHG
AV REGURGITATION PRESSURE HALF TIME: 879.51 MS
AV VALVE AREA BY VELOCITY RATIO: 1.8 CM²
AV VALVE AREA: 1.75 CM²
AV VELOCITY RATIO: 0.52
CV ECHO LV RWT: 0.44 CM
DOP CALC AO PEAK VEL: 2.59 M/S
DOP CALC AO VTI: 60.3 CM
DOP CALC LVOT AREA: 3.5 CM2
DOP CALC LVOT DIAMETER: 2.1 CM
DOP CALC LVOT PEAK VEL: 1.35 M/S
DOP CALC LVOT STROKE VOLUME: 105.59 CM3
DOP CALCLVOT PEAK VEL VTI: 30.5 CM
E WAVE DECELERATION TIME: 225.41 MSEC
E/A RATIO: 1.21
E/E' RATIO: 9.24 M/S
ECHO LV POSTERIOR WALL: 1 CM (ref 0.6–1.1)
FRACTIONAL SHORTENING: 42 % (ref 28–44)
INTERVENTRICULAR SEPTUM: 1.6 CM (ref 0.6–1.1)
IVC DIAMETER: 2.08 CM
LEFT ATRIUM AREA SYSTOLIC (APICAL 4 CHAMBER): 14.83 CM2
LEFT ATRIUM SIZE: 4.29 CM
LEFT INTERNAL DIMENSION IN SYSTOLE: 2.63 CM (ref 2.1–4)
LEFT VENTRICLE DIASTOLIC VOLUME: 95.87 ML
LEFT VENTRICLE END DIASTOLIC VOLUME APICAL 4 CHAMBER: 51.19 ML
LEFT VENTRICLE END SYSTOLIC VOLUME APICAL 4 CHAMBER: 34.86 ML
LEFT VENTRICLE SYSTOLIC VOLUME: 25.43 ML
LEFT VENTRICULAR INTERNAL DIMENSION IN DIASTOLE: 4.57 CM (ref 3.5–6)
LEFT VENTRICULAR MASS: 227.87 G
LV LATERAL E/E' RATIO: 8.82 M/S
LV SEPTAL E/E' RATIO: 9.7 M/S
LVED V (TEICH): 95.87 ML
LVES V (TEICH): 25.43 ML
LVOT MG: 3.72 MMHG
LVOT MV: 0.91 CM/S
MV PEAK A VEL: 0.8 M/S
MV PEAK E VEL: 0.97 M/S
OHS LV EJECTION FRACTION SIMPSONS BIPLANE MOD: 58 %
PISA AR MAX VEL: 2.57 M/S
PISA TR MAX VEL: 2.95 M/S
PV PEAK GRADIENT: 5 MMHG
PV PEAK VELOCITY: 1.13 M/S
RA MAJOR: 3.29 CM
RA PRESSURE ESTIMATED: 8 MMHG
RIGHT VENTRICLE DIASTOLIC BASEL DIMENSION: 4 CM
RIGHT VENTRICLE DIASTOLIC LENGTH: 5.7 CM
RIGHT VENTRICLE DIASTOLIC MID DIMENSION: 3.4 CM
RIGHT VENTRICULAR LENGTH IN DIASTOLE (APICAL 4-CHAMBER VIEW): 5.66 CM
RV MID DIAMA: 3.44 CM
RV TB RVSP: 11 MMHG
TDI LATERAL: 0.11 M/S
TDI SEPTAL: 0.1 M/S
TDI: 0.11 M/S
TR MAX PG: 35 MMHG
TRICUSPID ANNULAR PLANE SYSTOLIC EXCURSION: 2.43 CM
TV REST PULMONARY ARTERY PRESSURE: 43 MMHG

## 2024-07-25 PROCEDURE — 93017 CV STRESS TEST TRACING ONLY: CPT

## 2024-07-25 PROCEDURE — 93018 CV STRESS TEST I&R ONLY: CPT | Mod: ,,, | Performed by: STUDENT IN AN ORGANIZED HEALTH CARE EDUCATION/TRAINING PROGRAM

## 2024-07-25 PROCEDURE — 78452 HT MUSCLE IMAGE SPECT MULT: CPT | Mod: TC

## 2024-07-25 PROCEDURE — 93306 TTE W/DOPPLER COMPLETE: CPT

## 2024-07-25 PROCEDURE — 93016 CV STRESS TEST SUPVJ ONLY: CPT | Mod: ,,, | Performed by: STUDENT IN AN ORGANIZED HEALTH CARE EDUCATION/TRAINING PROGRAM

## 2024-07-25 PROCEDURE — A9500 TC99M SESTAMIBI: HCPCS | Performed by: STUDENT IN AN ORGANIZED HEALTH CARE EDUCATION/TRAINING PROGRAM

## 2024-07-25 PROCEDURE — 78452 HT MUSCLE IMAGE SPECT MULT: CPT | Mod: 26,,, | Performed by: STUDENT IN AN ORGANIZED HEALTH CARE EDUCATION/TRAINING PROGRAM

## 2024-07-25 RX ORDER — TETRAKIS(2-METHOXYISOBUTYLISOCYANIDE)COPPER(I) TETRAFLUOROBORATE 1 MG/ML
11 INJECTION, POWDER, LYOPHILIZED, FOR SOLUTION INTRAVENOUS
Status: COMPLETED | OUTPATIENT
Start: 2024-07-25 | End: 2024-07-25

## 2024-07-25 RX ORDER — TETRAKIS(2-METHOXYISOBUTYLISOCYANIDE)COPPER(I) TETRAFLUOROBORATE 1 MG/ML
31 INJECTION, POWDER, LYOPHILIZED, FOR SOLUTION INTRAVENOUS
Status: COMPLETED | OUTPATIENT
Start: 2024-07-25 | End: 2024-07-25

## 2024-07-25 RX ADMIN — KIT FOR THE PREPARATION OF TECHNETIUM TC99M SESTAMIBI 31 MILLICURIE: 1 INJECTION, POWDER, LYOPHILIZED, FOR SOLUTION PARENTERAL at 10:07

## 2024-07-25 RX ADMIN — KIT FOR THE PREPARATION OF TECHNETIUM TC99M SESTAMIBI 11 MILLICURIE: 1 INJECTION, POWDER, LYOPHILIZED, FOR SOLUTION PARENTERAL at 08:07

## 2024-07-26 LAB
CV STRESS BASE HR: 58 BPM
DIASTOLIC BLOOD PRESSURE: 84 MMHG
OHS CV CPX 85 PERCENT MAX PREDICTED HEART RATE MALE: 142
OHS CV CPX ESTIMATED METS: 10
OHS CV CPX MAX PREDICTED HEART RATE: 167
OHS CV CPX PATIENT IS FEMALE: 0
OHS CV CPX PATIENT IS MALE: 1
OHS CV CPX PEAK DIASTOLIC BLOOD PRESSURE: 86 MMHG
OHS CV CPX PEAK HEAR RATE: 146 BPM
OHS CV CPX PEAK RATE PRESSURE PRODUCT: NORMAL
OHS CV CPX PEAK SYSTOLIC BLOOD PRESSURE: 196 MMHG
OHS CV CPX PERCENT MAX PREDICTED HEART RATE ACHIEVED: 87
OHS CV CPX RATE PRESSURE PRODUCT PRESENTING: 9106
STRESS ECHO POST EXERCISE DUR MIN: 8 MINUTES
STRESS ECHO POST EXERCISE DUR SEC: 30 SECONDS
STRESS ST DEPRESSION: 1.5 MM
SYSTOLIC BLOOD PRESSURE: 157 MMHG

## 2024-07-26 NOTE — PROGRESS NOTES
Can you please inform stress test is abnormal. Please schedule visit with me next week either to discuss further steps. Thanks.

## 2024-07-26 NOTE — PROGRESS NOTES
Pt.'s wife notified pt.'s stress test is abnormal appt next week to discuss further steps per Dr. Chung. Pt.'s wife voiced understanding.

## 2024-07-30 NOTE — PROGRESS NOTES
PCP: Lizette Alvarenga MD    Referring Provider:     Subjective:   Zach Briscoe is a 54 y.o. male with hx of recurrent CVAs/TIAs (no residual neuro deficits), hypertension and hyperlipidemia who presents for a follow-up visit.    7/31/24:  Follow up scheduled to discuss abnormal stress test.  Noted to have a small size, medium intensity reversible apical and apical anterior defect, possible ischemia.  Patient has had no recurrence chest pain/tightness in the last 1 month, has not needed to use the p.r.n. sublingual nitroglycerin.  He denies any dyspnea on exertion.    6/28/24: Presents for a new patient visit. Referred to Cardiology due to recurrent CVA/TIAs and hypertension.  April 2024 MRI with chronic infarct in the left parietal lobe (new compared to 05/26/23 MRI- likely happened during last 3 months) and small chronic infarcts in the left parietal lobe and chronic infarct in the left temporal lobe.  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 Duration is variable -> can last for hours.  No exacerbating or relieving factors noted.  Last echo from 04/04/2023 with mild-to-moderate aortic valve stenosis, normal biventricular function.  Denies palpitations, dyspnea, lightheadedness or syncopal episodes.    Fhx:  Mother (hypertension)  Shx:  Never smoker    EKG 06/28/2024:  Sinus bradycardia, right bundle-branch block   02/22/2023:  Normal sinus rhythm, right bundle-branch block    ECHO Results for orders placed during the hospital encounter of 07/25/24    Echo    Interpretation Summary    Left Ventricle: The left ventricle is normal in size. Septal thickening. There is normal systolic function with a visually estimated ejection fraction of 55 - 60%. Biplane (2D) method of discs ejection fraction is 58%. There is normal diastolic function.    Right Ventricle: Normal right ventricular cavity size.  Systolic function is normal.    Aortic Valve: The aortic valve is a trileaflet valve. Mildly calcified cusps. Mildly restricted motion. There is mild stenosis. Aortic valve area by VTI is 1.75 cm². Aortic valve peak velocity is 2.59 m/s. Mean gradient is 15 mmHg. The dimensionless index is 0.51. There is mild aortic regurgitation with an eccentrically directed jet.    Mitral Valve: There is mild regurgitation with an eccentric jet.    Tricuspid Valve: There is mild to moderate regurgitation.    Pulmonary Artery: The estimated pulmonary artery systolic pressure is 43 mmHg.    IVC/SVC: Intermediate venous pressure at 8 mmHg.    Results for orders placed during the hospital encounter of 04/04/23    Echo Saline Bubble? Yes    Interpretation Summary  · There is no evidence of intracardiac shunting.  · The left ventricle is normal in size with mild concentric hypertrophy and normal systolic function.  · The estimated ejection fraction is 55%.  · Normal left ventricular diastolic function.  · Atrial fibrillation not observed.  · Normal right ventricular size.  · Aortic valve not well visualized on parasternal short axis view. Probable bileaflet aortic valve.  · There is mild-to-moderate aortic valve stenosis.  · Aortic valve area is 1.43 cm2; peak velocity is 2.9 m/s; mean gradient is 15 mmHg.  · Mild aortic regurgitation.  · Mild tricuspid regurgitation.  · Normal central venous pressure (3 mmHg).  · The estimated PA systolic pressure is 34 mmHg.     NM MPI 7/25/24:  Impression:     1. Small size, medium intensity reversible apical and apical anterior defect, with decreased wall motion and thickening, suggestive of ischemia.  2. the global left ventricular systolic function is normal with an LV ejection fraction of 61 % and no evidence of LV dilatation. Wall motion is impaired.     Lab Results   Component Value Date     06/28/2024    K 3.8 06/28/2024     (H) 06/28/2024    CO2 28 06/28/2024    BUN 18 06/28/2024     CREATININE 1.46 (H) 06/28/2024    CALCIUM 10.4 (H) 06/28/2024    ANIONGAP 9 06/28/2024    ESTGFRAFRICA 75 11/16/2021    EGFRNONAA 49 (L) 04/25/2022       Lab Results   Component Value Date    CHOL 250 (H) 01/29/2024    CHOL 208 (H) 02/02/2023    CHOL 195 11/16/2021     Lab Results   Component Value Date    HDL 41 01/29/2024    HDL 41 02/02/2023    HDL 34 (L) 11/16/2021     Lab Results   Component Value Date    LDLCALC 160 01/29/2024    LDLCALC 144 02/02/2023    LDLCALC 135 11/16/2021     Lab Results   Component Value Date    TRIG 247 (H) 01/29/2024    TRIG 117 02/02/2023    TRIG 128 11/16/2021     Lab Results   Component Value Date    CHOLHDL 6.1 01/29/2024    CHOLHDL 5.1 02/02/2023    CHOLHDL 5.7 11/16/2021       Lab Results   Component Value Date    WBC 5.95 01/29/2024    HGB 15.0 01/29/2024    HCT 44.4 01/29/2024    MCV 89.2 01/29/2024     01/29/2024           Current Outpatient Medications:     amLODIPine (NORVASC) 10 MG tablet, Take 1 tablet (10 mg total) by mouth once daily., Disp: 90 tablet, Rfl: 3    aspirin (ECOTRIN) 81 MG EC tablet, Take 81 mg by mouth once daily., Disp: , Rfl:     atorvastatin (LIPITOR) 40 MG tablet, Take 1 tablet (40 mg total) by mouth once daily., Disp: 90 tablet, Rfl: 3    benazepriL (LOTENSIN) 20 MG tablet, Take 1 tablet (20 mg total) by mouth once daily., Disp: 90 tablet, Rfl: 3    benazepril-hydrochlorthiazide (LOTENSIN HCT) 20-25 mg Tab, Take 1 tablet by mouth once daily., Disp: 90 tablet, Rfl: 3    FOLBEE PLUS 5 mg Tab, Take 1 tablet by mouth., Disp: , Rfl:     multivitamin capsule, Take 1 capsule by mouth once daily., Disp: , Rfl:     nitroGLYCERIN (NITROSTAT) 0.4 MG SL tablet, Place 1 tablet (0.4 mg total) under the tongue every 5 (five) minutes as needed for Chest pain (x 3 doses. Please go to the ER if chest pain persits after 3rd dose of nitroglycerin)., Disp: 30 tablet, Rfl: 0    vit b comp & c-fa-copper-zinc 5-1.5-25 mg (FOLBEE PLUS) 5-1.5-25 mg Tab, Take 1 tablet  "by mouth once daily., Disp: 30 each, Rfl: 11    Review of Systems   Constitutional:  Negative for chills, diaphoresis, fever and malaise/fatigue.   Respiratory:  Negative for cough and shortness of breath.    Cardiovascular:  Positive for chest pain. Negative for palpitations, orthopnea, claudication, leg swelling and PND.   Gastrointestinal:  Negative for abdominal pain, heartburn, nausea and vomiting.   Neurological:  Negative for dizziness.       Objective:   BP (!) 140/90 (BP Location: Left arm, Patient Position: Sitting)   Pulse 63   Ht 5' 9" (1.753 m)   Wt 100.6 kg (221 lb 12.8 oz)   SpO2 (!) 94%   BMI 32.75 kg/m²     Physical Exam  Constitutional:       General: He is not in acute distress.     Appearance: Normal appearance.   Cardiovascular:      Rate and Rhythm: Normal rate and regular rhythm.      Pulses: Normal pulses.      Heart sounds: Normal heart sounds. No murmur heard.     No friction rub. No gallop.   Pulmonary:      Effort: Pulmonary effort is normal.      Breath sounds: Normal breath sounds. No wheezing or rales.   Musculoskeletal:      Right lower leg: No edema.      Left lower leg: No edema.   Skin:     General: Skin is warm and dry.   Neurological:      Mental Status: He is alert.           Assessment:     1. Abnormal stress test        2. Recurrent cerebrovascular accidents (CVAs)        3. Nonrheumatic aortic valve stenosis        4. Chest pain, unspecified type        5. Primary hypertension                Plan:   No problem-specific Assessment & Plan notes found for this encounter.    Chest pain (resolved)  Abnormal stress test   Small apical and apical anterior reversible defect   Intermediate risk study  - Schedule coronary CTA for evaluation of coronary anatomy (patient requests [facility])  - continue aspirin 81 mg daily and atorvastatin 40 mg daily  - continue amlodipine 10 mg daily    Recurrent CVAs/TIAs  30-day ambulatory cardiac monitor (MCOT) ordered at last visit, to be mailed in " (to assess for paroxysmal atrial fibrillation).  - Patient notes that he has still not received the monitor. Office will follow-up re: delay.     Mild AS  - Recent echo w/ mild AS. Repeat echo in 3-5 years.     Hypertension  Blood pressure 140/90  Medications were changed 1 month ago  - Continue benazepril 40 mg daily and HCTZ 25 mg daily and amlodipine 10 mg  - Maintain blood pressure log and bring to next visit    Follow up in 3 months

## 2024-07-31 ENCOUNTER — OFFICE VISIT (OUTPATIENT)
Dept: CARDIOLOGY | Facility: CLINIC | Age: 54
End: 2024-07-31
Payer: COMMERCIAL

## 2024-07-31 VITALS
OXYGEN SATURATION: 94 % | WEIGHT: 221.81 LBS | HEIGHT: 69 IN | SYSTOLIC BLOOD PRESSURE: 140 MMHG | BODY MASS INDEX: 32.85 KG/M2 | HEART RATE: 63 BPM | DIASTOLIC BLOOD PRESSURE: 90 MMHG

## 2024-07-31 DIAGNOSIS — I10 PRIMARY HYPERTENSION: ICD-10-CM

## 2024-07-31 DIAGNOSIS — I35.0 NONRHEUMATIC AORTIC VALVE STENOSIS: ICD-10-CM

## 2024-07-31 DIAGNOSIS — I63.9 RECURRENT CEREBROVASCULAR ACCIDENTS (CVAS): ICD-10-CM

## 2024-07-31 DIAGNOSIS — R07.9 CHEST PAIN, UNSPECIFIED TYPE: ICD-10-CM

## 2024-07-31 DIAGNOSIS — R94.39 ABNORMAL STRESS TEST: Primary | ICD-10-CM

## 2024-07-31 PROCEDURE — 99999 PR PBB SHADOW E&M-EST. PATIENT-LVL IV: CPT | Mod: PBBFAC,,, | Performed by: STUDENT IN AN ORGANIZED HEALTH CARE EDUCATION/TRAINING PROGRAM

## 2024-07-31 PROCEDURE — 99214 OFFICE O/P EST MOD 30 MIN: CPT | Mod: PBBFAC | Performed by: STUDENT IN AN ORGANIZED HEALTH CARE EDUCATION/TRAINING PROGRAM

## 2024-07-31 NOTE — PATIENT INSTRUCTIONS
CCTA to be scheduled at [facility]  Maintain BP log and bring to next visit   Monitor to be mailed in  Follow-up in 3 months

## 2024-08-13 DIAGNOSIS — R94.31 ABNORMAL ELECTROCARDIOGRAM (ECG) (EKG): ICD-10-CM

## 2024-08-13 DIAGNOSIS — R94.39 ABNORMAL STRESS TEST: Primary | ICD-10-CM

## 2024-09-03 ENCOUNTER — TELEPHONE (OUTPATIENT)
Dept: CARDIOLOGY | Facility: CLINIC | Age: 54
End: 2024-09-03
Payer: COMMERCIAL

## 2024-09-03 NOTE — TELEPHONE ENCOUNTER
Patient is needing a referral to [facility] for a CCTA.  The referral has been faxed over 6 times with no success.  I have emailed Deneen Alonso and Madeleine Alicea.  I emailed the order over again today to Ms. Alicea.

## 2024-10-10 ENCOUNTER — TELEPHONE (OUTPATIENT)
Dept: NEUROLOGY | Facility: CLINIC | Age: 54
End: 2024-10-10
Payer: COMMERCIAL

## 2024-10-10 NOTE — TELEPHONE ENCOUNTER
Pt's wife returned call states pt eye Dr said something about an evaluation to be done. I told her we need his note to look at. She states she will have them to send the note to NOLA Patel NP.           Returned call no answer and mail box full        ----- Message from Codie sent at 10/10/2024  1:47 PM CDT -----  Regarding: Call Back - Pt Evaluation  Who Called: Zach Briscoe    Caller is requesting assistance/information from provider's office.      Preferred Method of Contact: Phone Call  Patient's Preferred Phone Number on File: [phone]   Best Call Back Number, if different:   Additional Information: Pt would like a call back concerning evaluation his eye dr needing to be done on pt.

## 2024-10-11 ENCOUNTER — TELEPHONE (OUTPATIENT)
Dept: NEUROLOGY | Facility: CLINIC | Age: 54
End: 2024-10-11
Payer: COMMERCIAL

## 2024-10-11 NOTE — TELEPHONE ENCOUNTER
Returned call tp pt's wife she needed our fax number to have eye Dr to fax note.      ----- Message from Brandee sent at 10/11/2024 11:48 AM CDT -----  Regarding: PT CALL BACK  Who Called: Zach Briscoe    Caller is requesting assistance/information from provider's office.      Patient's Preferred Phone Number on File: [phone]

## 2024-10-22 ENCOUNTER — OFFICE VISIT (OUTPATIENT)
Dept: PRIMARY CARE CLINIC | Facility: CLINIC | Age: 54
End: 2024-10-22
Payer: COMMERCIAL

## 2024-10-22 ENCOUNTER — OFFICE VISIT (OUTPATIENT)
Dept: NEUROLOGY | Facility: CLINIC | Age: 54
End: 2024-10-22
Payer: COMMERCIAL

## 2024-10-22 VITALS
DIASTOLIC BLOOD PRESSURE: 88 MMHG | HEART RATE: 71 BPM | SYSTOLIC BLOOD PRESSURE: 138 MMHG | OXYGEN SATURATION: 99 % | HEIGHT: 69 IN | BODY MASS INDEX: 33.47 KG/M2 | TEMPERATURE: 97 F | WEIGHT: 226 LBS | RESPIRATION RATE: 20 BRPM

## 2024-10-22 VITALS
HEART RATE: 69 BPM | OXYGEN SATURATION: 95 % | DIASTOLIC BLOOD PRESSURE: 86 MMHG | HEIGHT: 69 IN | RESPIRATION RATE: 18 BRPM | BODY MASS INDEX: 33.47 KG/M2 | SYSTOLIC BLOOD PRESSURE: 138 MMHG | WEIGHT: 226 LBS

## 2024-10-22 DIAGNOSIS — I10 PRIMARY HYPERTENSION: ICD-10-CM

## 2024-10-22 DIAGNOSIS — G45.9 TIA (TRANSIENT ISCHEMIC ATTACK): ICD-10-CM

## 2024-10-22 DIAGNOSIS — E78.5 HYPERLIPIDEMIA, UNSPECIFIED HYPERLIPIDEMIA TYPE: ICD-10-CM

## 2024-10-22 DIAGNOSIS — I10 PRIMARY HYPERTENSION: Primary | ICD-10-CM

## 2024-10-22 DIAGNOSIS — I63.9 CEREBROVASCULAR ACCIDENT (CVA), UNSPECIFIED MECHANISM: Primary | ICD-10-CM

## 2024-10-22 DIAGNOSIS — H02.403 PTOSIS OF BOTH EYELIDS: ICD-10-CM

## 2024-10-22 DIAGNOSIS — E03.9 HYPOTHYROIDISM, UNSPECIFIED TYPE: ICD-10-CM

## 2024-10-22 DIAGNOSIS — R53.83 FATIGUE, UNSPECIFIED TYPE: ICD-10-CM

## 2024-10-22 DIAGNOSIS — E53.8 VITAMIN B12 DEFICIENCY: ICD-10-CM

## 2024-10-22 PROBLEM — H02.409 PTOSIS OF EYELID: Status: ACTIVE | Noted: 2024-10-22

## 2024-10-22 PROBLEM — H55.01 CONGENITAL NYSTAGMUS: Status: ACTIVE | Noted: 2024-10-22

## 2024-10-22 LAB
ALBUMIN SERPL BCP-MCNC: 3.9 G/DL (ref 3.5–5)
ALBUMIN/GLOB SERPL: 0.9 {RATIO}
ALP SERPL-CCNC: 79 U/L (ref 45–115)
ALT SERPL W P-5'-P-CCNC: 35 U/L (ref 16–61)
ANION GAP SERPL CALCULATED.3IONS-SCNC: 9 MMOL/L (ref 7–16)
AST SERPL W P-5'-P-CCNC: 21 U/L (ref 15–37)
BASOPHILS # BLD AUTO: 0.01 K/UL (ref 0–0.2)
BASOPHILS NFR BLD AUTO: 0.2 % (ref 0–1)
BILIRUB SERPL-MCNC: 0.4 MG/DL (ref ?–1.2)
BUN SERPL-MCNC: 23 MG/DL (ref 7–18)
BUN/CREAT SERPL: 16 (ref 6–20)
CALCIUM SERPL-MCNC: 10.4 MG/DL (ref 8.5–10.1)
CHLORIDE SERPL-SCNC: 107 MMOL/L (ref 98–107)
CHOLEST SERPL-MCNC: 125 MG/DL (ref 0–200)
CHOLEST/HDLC SERPL: 2.8 {RATIO}
CO2 SERPL-SCNC: 28 MMOL/L (ref 21–32)
CREAT SERPL-MCNC: 1.42 MG/DL (ref 0.7–1.3)
DIFFERENTIAL METHOD BLD: ABNORMAL
EGFR (NO RACE VARIABLE) (RUSH/TITUS): 59 ML/MIN/1.73M2
EOSINOPHIL # BLD AUTO: 0.15 K/UL (ref 0–0.5)
EOSINOPHIL NFR BLD AUTO: 2.7 % (ref 1–4)
ERYTHROCYTE [DISTWIDTH] IN BLOOD BY AUTOMATED COUNT: 12.1 % (ref 11.5–14.5)
GLOBULIN SER-MCNC: 4.3 G/DL (ref 2–4)
GLUCOSE SERPL-MCNC: 115 MG/DL (ref 74–106)
HCT VFR BLD AUTO: 40.2 % (ref 40–54)
HDLC SERPL-MCNC: 44 MG/DL (ref 40–60)
HGB BLD-MCNC: 13.2 G/DL (ref 13.5–18)
IMM GRANULOCYTES # BLD AUTO: 0.02 K/UL (ref 0–0.04)
IMM GRANULOCYTES NFR BLD: 0.4 % (ref 0–0.4)
LDLC SERPL CALC-MCNC: 63 MG/DL
LDLC/HDLC SERPL: 1.4 {RATIO}
LYMPHOCYTES # BLD AUTO: 1.44 K/UL (ref 1–4.8)
LYMPHOCYTES NFR BLD AUTO: 26.3 % (ref 27–41)
MCH RBC QN AUTO: 30.1 PG (ref 27–31)
MCHC RBC AUTO-ENTMCNC: 32.8 G/DL (ref 32–36)
MCV RBC AUTO: 91.6 FL (ref 80–96)
MONOCYTES # BLD AUTO: 0.49 K/UL (ref 0–0.8)
MONOCYTES NFR BLD AUTO: 8.9 % (ref 2–6)
MPC BLD CALC-MCNC: 9.2 FL (ref 9.4–12.4)
NEUTROPHILS # BLD AUTO: 3.37 K/UL (ref 1.8–7.7)
NEUTROPHILS NFR BLD AUTO: 61.5 % (ref 53–65)
NONHDLC SERPL-MCNC: 81 MG/DL
NRBC # BLD AUTO: 0 X10E3/UL
NRBC, AUTO (.00): 0 %
PLATELET # BLD AUTO: 286 K/UL (ref 150–400)
POTASSIUM SERPL-SCNC: 4.2 MMOL/L (ref 3.5–5.1)
PROT SERPL-MCNC: 8.2 G/DL (ref 6.4–8.2)
RBC # BLD AUTO: 4.39 M/UL (ref 4.6–6.2)
SODIUM SERPL-SCNC: 140 MMOL/L (ref 136–145)
TESTOST SERPL-MCNC: 290 NG/DL (ref 240–950)
TRIGL SERPL-MCNC: 89 MG/DL (ref 35–150)
VLDLC SERPL-MCNC: 18 MG/DL
WBC # BLD AUTO: 5.48 K/UL (ref 4.5–11)

## 2024-10-22 PROCEDURE — 4010F ACE/ARB THERAPY RXD/TAKEN: CPT | Mod: ,,, | Performed by: NURSE PRACTITIONER

## 2024-10-22 PROCEDURE — 3044F HG A1C LEVEL LT 7.0%: CPT | Mod: ,,, | Performed by: NURSE PRACTITIONER

## 2024-10-22 PROCEDURE — 1159F MED LIST DOCD IN RCRD: CPT | Mod: ,,, | Performed by: NURSE PRACTITIONER

## 2024-10-22 PROCEDURE — 3008F BODY MASS INDEX DOCD: CPT | Mod: CPTII,,, | Performed by: FAMILY MEDICINE

## 2024-10-22 PROCEDURE — 80053 COMPREHEN METABOLIC PANEL: CPT | Mod: ,,, | Performed by: CLINICAL MEDICAL LABORATORY

## 2024-10-22 PROCEDURE — 99213 OFFICE O/P EST LOW 20 MIN: CPT | Mod: S$PBB,,, | Performed by: NURSE PRACTITIONER

## 2024-10-22 PROCEDURE — 3008F BODY MASS INDEX DOCD: CPT | Mod: ,,, | Performed by: NURSE PRACTITIONER

## 2024-10-22 PROCEDURE — 99215 OFFICE O/P EST HI 40 MIN: CPT | Mod: PBBFAC | Performed by: NURSE PRACTITIONER

## 2024-10-22 PROCEDURE — 1160F RVW MEDS BY RX/DR IN RCRD: CPT | Mod: ,,, | Performed by: NURSE PRACTITIONER

## 2024-10-22 PROCEDURE — 99213 OFFICE O/P EST LOW 20 MIN: CPT | Mod: ,,, | Performed by: FAMILY MEDICINE

## 2024-10-22 PROCEDURE — 85025 COMPLETE CBC W/AUTO DIFF WBC: CPT | Mod: ,,, | Performed by: CLINICAL MEDICAL LABORATORY

## 2024-10-22 PROCEDURE — 3075F SYST BP GE 130 - 139MM HG: CPT | Mod: CPTII,,, | Performed by: FAMILY MEDICINE

## 2024-10-22 PROCEDURE — 3044F HG A1C LEVEL LT 7.0%: CPT | Mod: CPTII,,, | Performed by: FAMILY MEDICINE

## 2024-10-22 PROCEDURE — 4010F ACE/ARB THERAPY RXD/TAKEN: CPT | Mod: CPTII,,, | Performed by: FAMILY MEDICINE

## 2024-10-22 PROCEDURE — 3075F SYST BP GE 130 - 139MM HG: CPT | Mod: ,,, | Performed by: NURSE PRACTITIONER

## 2024-10-22 PROCEDURE — 99999 PR PBB SHADOW E&M-EST. PATIENT-LVL V: CPT | Mod: PBBFAC,,, | Performed by: NURSE PRACTITIONER

## 2024-10-22 PROCEDURE — 84403 ASSAY OF TOTAL TESTOSTERONE: CPT | Mod: ,,, | Performed by: CLINICAL MEDICAL LABORATORY

## 2024-10-22 PROCEDURE — 3079F DIAST BP 80-89 MM HG: CPT | Mod: ,,, | Performed by: NURSE PRACTITIONER

## 2024-10-22 PROCEDURE — 3079F DIAST BP 80-89 MM HG: CPT | Mod: CPTII,,, | Performed by: FAMILY MEDICINE

## 2024-10-22 PROCEDURE — 80061 LIPID PANEL: CPT | Mod: ,,, | Performed by: CLINICAL MEDICAL LABORATORY

## 2024-10-22 RX ORDER — MECOBALAMIN 1000 MCG
1 TABLET,CHEWABLE ORAL DAILY
COMMUNITY

## 2024-10-22 RX ORDER — CYANOCOBALAMIN/FOLIC AC/VIT B6 1-2.5-25MG
1 TABLET ORAL DAILY
COMMUNITY

## 2024-10-22 NOTE — PATIENT INSTRUCTIONS
Continue current medications as directed  Labs as ordered  Once labs are obtained will refer to neuro-ophthalmologist at [facility] for 2nd opinion  Follow-up 3 months    Stroke risk modifiers:    1. Good sleep habits, recommend at least 7 hours total sleep daily  2. Continue management of blood pressure and cholesterol including medications, exercise, and good eating habits  3. Exercise as much as possible, recommend 5 days of the week for 30 minutes each day  4. Avoid smoking and alcohol  5. Go to the nearest emergency department immediately if any new or worsening weakness, speech difficulty, or numbness

## 2024-10-22 NOTE — PROGRESS NOTES
Subjective:       Patient ID: Zach Briscoe is a 54 y.o. male     Chief Complaint:    Chief Complaint   Patient presents with    Follow-up        Allergies:  Patient has no known allergies.    Current Medications:    Outpatient Encounter Medications as of 10/22/2024   Medication Sig Dispense Refill    amLODIPine (NORVASC) 10 MG tablet Take 1 tablet (10 mg total) by mouth once daily. 90 tablet 3    aspirin (ECOTRIN) 81 MG EC tablet Take 81 mg by mouth once daily.      atorvastatin (LIPITOR) 40 MG tablet Take 1 tablet (40 mg total) by mouth once daily. 90 tablet 3    benazepriL (LOTENSIN) 20 MG tablet Take 1 tablet (20 mg total) by mouth once daily. 90 tablet 3    benazepril-hydrochlorthiazide (LOTENSIN HCT) 20-25 mg Tab Take 1 tablet by mouth once daily. 90 tablet 3    FOLBEE PLUS 5 mg Tab Take 1 tablet by mouth.      folic acid-vit B6-vit B12 (FOLBEE) 2.5-25-1 mg Tab Take 1 tablet by mouth once daily.      mecobalamin, vitamin B12, 1,000 mcg Chew Take 1 tablet by mouth once daily.      multivitamin capsule Take 1 capsule by mouth once daily.      nitroGLYCERIN (NITROSTAT) 0.4 MG SL tablet Place 1 tablet (0.4 mg total) under the tongue every 5 (five) minutes as needed for Chest pain (x 3 doses. Please go to the ER if chest pain persits after 3rd dose of nitroglycerin). 30 tablet 0    vit b comp & c-fa-copper-zinc 5-1.5-25 mg (FOLBEE PLUS) 5-1.5-25 mg Tab Take 1 tablet by mouth once daily. 30 each 11     No facility-administered encounter medications on file as of 10/22/2024.       History of Present Illness  53 y/o male following in neurology for history of CVA.  Last clinic visit 3 months    Prior MRI brain done 5/3023 had shown a sub-acute CVA left temporal and left occipital lobes.  He was told to take his ASA, statin, and HTN medication as directed.  Had already been told this in 2017 when prior followed by us but apparently had stopped it and not kept follow-up with us.  He then did not return to the  clinic for 11 months, cancelling his scheduled follow-up with us.  Notes in the EMR indicated he had run out of his medications.  He was seen by us in March of 2024 with reported worsening in left arm weakness and speech difficulty for about 3 months, onset during the time he was out of all his medications.  I obtained another MRI brain 4/2/24, which questioned new CVA left parietal lobe.    He was seen by Dr. Chung in cardiology in late June of 2024, this was referral from us due to the CVA for evaluation to r/o cardiac related reason for CVA, though non-compliance with treatment plan is likely main source.    I see he was evaluated at [facility] 9/26/24 for reported blurring vision and bilateral ptosis.  It would appear they were planning surgery for this.  The note is scanned in media.    Feel we need to obtain myasthenia gravis workup to rule out ocular myasthenia gravis.  Plan to obtain the labs today.  However they report at [facility] they felt possibly a nerve component to his visual symptoms, though prior MRI brain imaging noted as above, however his symptoms are bilateral and not unilateral.  I would recommend a 2nd opinion with neuro-ophthalmologist which I can refer to at [facility] once labs are obtained.             Review of Systems  Review of Systems   Constitutional:  Negative for diaphoresis and fever.   HENT:  Negative for congestion, hearing loss and tinnitus.    Eyes:  Negative for blurred vision, double vision, photophobia, discharge and redness.   Respiratory:  Negative for cough and shortness of breath.    Cardiovascular:  Negative for chest pain.   Gastrointestinal:  Negative for abdominal pain, nausea and vomiting.   Musculoskeletal:  Negative for back pain, joint pain, myalgias and neck pain.   Skin:  Negative for itching and rash.   Neurological:  Positive for sensory change, weakness and headaches. Negative for dizziness, tremors, speech change, focal weakness, seizures and loss of consciousness.    Psychiatric/Behavioral:  Positive for memory loss. Negative for depression and hallucinations. The patient does not have insomnia.    All other systems reviewed and are negative.     Objective:     NEUROLOGICAL EXAMINATION:     MENTAL STATUS   Oriented to person, place, and time.   Attention: normal. Concentration: normal.   Speech: speech is normal   Level of consciousness: alert  Knowledge: good and consistent with education.   Normal comprehension.     CRANIAL NERVES     CN II   Visual fields full to confrontation.   Visual acuity: normal  Right visual field deficit: none  Left visual field deficit: none     CN III, IV, VI   Pupils are equal, round, and reactive to light.  Extraocular motions are normal.   Right pupil: Size: 3 mm. Shape: regular. Reactivity: brisk. Consensual response: intact. Accommodation: intact.   Left pupil: Size: 3 mm. Shape: regular. Reactivity: brisk. Consensual response: intact. Accommodation: intact.   CN III: no CN III palsy  CN VI: no CN VI palsy  Nystagmus: bilateral   Nystagmus type: rapid and horizontal  Diplopia: none  Upgaze: normal  Downgaze: normal  Conjugate gaze: present  Vestibulo-ocular reflex: present    CN V   Facial sensation intact.   Right facial sensation deficit: none  Left facial sensation deficit: none  Right corneal reflex: normal  Left corneal reflex: normal    CN VII   Facial expression full, symmetric.   Right facial weakness: none  Left facial weakness: none  Right taste: normal  Left taste: normal    CN VIII   CN VIII normal.   Hearing: intact  Right Rinne: AC > BC  Left Rinne: AC > BC    CN IX, X   CN IX normal.   CN X normal.   Palate: symmetric    CN XI   CN XI normal.   Right sternocleidomastoid strength: normal  Left sternocleidomastoid strength: normal  Right trapezius strength: normal  Left trapezius strength: normal    CN XII   CN XII normal.   Tongue: not atrophic  Fasciculations: absent  Tongue deviation: none       Nystagmus is long standing  since birth     MOTOR EXAM   Muscle bulk: normal  Overall muscle tone: normal  Right arm tone: normal  Left arm tone: normal  Right arm pronator drift: absent  Left arm pronator drift: present  Right leg tone: normal  Left leg tone: normal    Strength   Right neck flexion: 5/5  Left neck flexion: 5/5  Right neck extension: 5/5  Left neck extension: 5/5  Right deltoid: 5/5  Left deltoid: 5/5  Right biceps: 5/5  Left biceps: 4/5  Right triceps: 5/5  Left triceps: 4/5  Right wrist flexion: 5/5  Left wrist flexion: 5/5  Right wrist extension: 5/5  Left wrist extension:   Right interossei: 5  Left interossei: /  Right iliopsoas:   Left iliopsoas:   Right quadriceps:   Left quadriceps:   Right hamstrin/5  Left hamstrin/5  Right anterior tibial:   Left anterior tibial:   Right posterior tibial:   Left posterior tibial:   Right gastroc:   Left gastroc: 5/    REFLEXES     Reflexes   Right brachioradialis: 2+  Left brachioradialis: 2+  Right biceps: 2+  Left biceps: 2+  Right triceps: 2+  Left triceps: 2+  Right patellar: 2+  Left patellar: 2+  Right achilles: 2+  Left achilles: 2+  Right plantar: normal  Left plantar: normal  Right Wong: absent  Left Wong: absent  Right ankle clonus: absent  Left ankle clonus: absent  Right pendular knee jerk: absent  Left pendular knee jerk: absent    SENSORY EXAM   Light touch normal.   Right arm light touch: normal  Left arm light touch: normal  Right leg light touch: normal  Left leg light touch: normal  Vibration normal.   Right arm vibration: normal  Left arm vibration: normal  Right leg vibration: normal  Left leg vibration: normal  Proprioception normal.   Right arm proprioception: normal  Left arm proprioception: normal  Right leg proprioception: normal  Left leg proprioception: normal  Pinprick normal.   Right arm pinprick: normal  Left arm pinprick: normal  Right leg pinprick: normal  Left leg pinprick: normal  Graphesthesia:  normal  Romberg: negative  Stereognosis: normal    GAIT AND COORDINATION     Gait  Gait: normal     Coordination   Finger to nose coordination: normal  Heel to shin coordination: normal  Tandem walking coordination: normal    Tremor   Resting tremor: absent  Intention tremor: absent  Action tremor: absent       Physical Exam  Vitals and nursing note reviewed.   Constitutional:       Appearance: Normal appearance.   HENT:      Head: Normocephalic.      Ears:      Right Rinne: AC > BC.     Left Rinne: AC > BC.  Eyes:      Extraocular Movements: EOM normal.      Pupils: Pupils are equal, round, and reactive to light.   Cardiovascular:      Rate and Rhythm: Normal rate and regular rhythm.      Pulses: Normal pulses.      Heart sounds: Normal heart sounds.   Pulmonary:      Effort: Pulmonary effort is normal.      Breath sounds: Normal breath sounds.   Musculoskeletal:         General: Normal range of motion.      Cervical back: Normal range of motion and neck supple.   Skin:     General: Skin is warm and dry.   Neurological:      General: No focal deficit present.      Mental Status: He is alert and oriented to person, place, and time.      Cranial Nerves: No cranial nerve deficit.      Sensory: Sensory deficit present.      Motor: Weakness present.      Coordination: Coordination normal. Finger-Nose-Finger Test, Heel to Shin Test and Romberg Test normal.      Gait: Gait is intact. Gait and tandem walk normal.      Deep Tendon Reflexes: Reflexes normal.      Reflex Scores:       Tricep reflexes are 2+ on the right side and 2+ on the left side.       Bicep reflexes are 2+ on the right side and 2+ on the left side.       Brachioradialis reflexes are 2+ on the right side and 2+ on the left side.       Patellar reflexes are 2+ on the right side and 2+ on the left side.       Achilles reflexes are 2+ on the right side and 2+ on the left side.  Psychiatric:         Mood and Affect: Mood normal.         Speech: Speech normal.          Behavior: Behavior normal.          Assessment:     Problem List Items Addressed This Visit          Neuro    Cerebrovascular accident (CVA) - Primary       Ophtho    Ptosis of eyelid    Relevant Orders    Acetylcholine Receptor, Binding    Musk Antibody Test       Cardiac/Vascular    Primary hypertension    Hyperlipidemia       Endocrine    Vitamin B12 deficiency    Relevant Orders    Vitamin B12    Hypothyroidism    Relevant Orders    TSH                Primary Diagnosis and ICD10  Cerebrovascular accident (CVA), unspecified mechanism [I63.9]    Plan:     Patient Instructions   Continue current medications as directed  Labs as ordered  Once labs are obtained will refer to neuro-ophthalmologist at [facility] for 2nd opinion  Follow-up 3 months    Stroke risk modifiers:    1. Good sleep habits, recommend at least 7 hours total sleep daily  2. Continue management of blood pressure and cholesterol including medications, exercise, and good eating habits  3. Exercise as much as possible, recommend 5 days of the week for 30 minutes each day  4. Avoid smoking and alcohol  5. Go to the nearest emergency department immediately if any new or worsening weakness, speech difficulty, or numbness     There are no discontinued medications.      Requested Prescriptions      No prescriptions requested or ordered in this encounter       Orders Placed This Encounter   Procedures    Acetylcholine Receptor, Binding    Musk Antibody Test    Vitamin B12    TSH

## 2024-10-22 NOTE — PROGRESS NOTES
Subjective     Patient ID: Zach Briscoe is a 54 y.o. male.    Chief Complaint: Annual Exam (Pt is requesting testosterone injections)    Pt. Requesting testosterone injections for energy. Discussion. Seeing cardiology today. Has had a TIA. Getting results of echocardiogram      Review of Systems   Constitutional:  Negative for fatigue and fever.   HENT:  Negative for dental problem.    Eyes:  Negative for discharge.   Respiratory:  Negative for cough, choking, chest tightness and shortness of breath.    Cardiovascular:  Negative for chest pain and leg swelling.   Gastrointestinal:  Negative for constipation, diarrhea, nausea and vomiting.   Genitourinary:  Negative for discharge and flank pain.   Musculoskeletal:  Negative for arthralgias and myalgias.   Allergic/Immunologic: Negative for environmental allergies.   Neurological:  Negative for headaches and memory loss.   Psychiatric/Behavioral:  Negative for behavioral problems and hallucinations.           Objective     Physical Exam  Vitals and nursing note reviewed.   Constitutional:       Appearance: Normal appearance. He is normal weight.   HENT:      Head: Normocephalic and atraumatic.      Right Ear: Tympanic membrane normal.      Left Ear: Tympanic membrane normal.      Nose: Nose normal.      Mouth/Throat:      Mouth: Mucous membranes are moist.   Eyes:      Extraocular Movements: Extraocular movements intact.      Conjunctiva/sclera: Conjunctivae normal.      Pupils: Pupils are equal, round, and reactive to light.   Cardiovascular:      Rate and Rhythm: Normal rate and regular rhythm.      Pulses: Normal pulses.      Heart sounds: Murmur heard.      Systolic murmur is present with a grade of 1/6.   Pulmonary:      Effort: Pulmonary effort is normal.      Breath sounds: Normal breath sounds.   Abdominal:      General: Abdomen is flat. Bowel sounds are normal.      Palpations: Abdomen is soft.   Musculoskeletal:         General: Normal range of  motion.      Cervical back: Normal range of motion and neck supple.      Right lower leg: No edema.      Left lower leg: No edema.   Skin:     General: Skin is warm and dry.   Neurological:      General: No focal deficit present.      Mental Status: He is alert and oriented to person, place, and time.   Psychiatric:         Mood and Affect: Mood normal.            Assessment and Plan     1. Primary hypertension  -     CBC Auto Differential; Future; Expected date: 10/22/2024  -     Comprehensive Metabolic Panel; Future; Expected date: 10/22/2024    2. Hyperlipidemia, unspecified hyperlipidemia type  -     Lipid Panel; Future; Expected date: 10/22/2024    3. Fatigue, unspecified type  -     Testosterone; Future; Expected date: 10/22/2024    4. TIA (transient ischemic attack)        Will call lab results  RTC as needed  No meds given today         No follow-ups on file.

## 2024-10-28 ENCOUNTER — TELEPHONE (OUTPATIENT)
Dept: NEUROLOGY | Facility: CLINIC | Age: 54
End: 2024-10-28
Payer: COMMERCIAL

## 2024-10-28 DIAGNOSIS — G70.00 OCULAR MYASTHENIA: Primary | ICD-10-CM

## 2024-10-29 ENCOUNTER — TELEPHONE (OUTPATIENT)
Dept: NEUROLOGY | Facility: CLINIC | Age: 54
End: 2024-10-29
Payer: COMMERCIAL

## 2024-11-04 ENCOUNTER — TELEPHONE (OUTPATIENT)
Dept: PRIMARY CARE CLINIC | Facility: CLINIC | Age: 54
End: 2024-11-04
Payer: COMMERCIAL

## 2024-11-15 ENCOUNTER — HOSPITAL ENCOUNTER (OUTPATIENT)
Dept: RADIOLOGY | Facility: HOSPITAL | Age: 54
Discharge: HOME | End: 2024-11-15
Attending: NURSE PRACTITIONER
Payer: COMMERCIAL

## 2024-11-15 ENCOUNTER — OFFICE VISIT (OUTPATIENT)
Dept: NEUROLOGY | Facility: CLINIC | Age: 54
End: 2024-11-15
Payer: COMMERCIAL

## 2024-11-15 VITALS
BODY MASS INDEX: 33.8 KG/M2 | WEIGHT: 228.19 LBS | SYSTOLIC BLOOD PRESSURE: 146 MMHG | HEIGHT: 69 IN | RESPIRATION RATE: 18 BRPM | OXYGEN SATURATION: 100 % | HEART RATE: 63 BPM | DIASTOLIC BLOOD PRESSURE: 76 MMHG

## 2024-11-15 DIAGNOSIS — H02.403 PTOSIS OF BOTH EYELIDS: ICD-10-CM

## 2024-11-15 DIAGNOSIS — G70.00 OCULAR MYASTHENIA: ICD-10-CM

## 2024-11-15 DIAGNOSIS — I10 PRIMARY HYPERTENSION: ICD-10-CM

## 2024-11-15 DIAGNOSIS — E78.5 HYPERLIPIDEMIA, UNSPECIFIED HYPERLIPIDEMIA TYPE: ICD-10-CM

## 2024-11-15 DIAGNOSIS — I63.9 CEREBROVASCULAR ACCIDENT (CVA), UNSPECIFIED MECHANISM: Primary | ICD-10-CM

## 2024-11-15 PROCEDURE — 99214 OFFICE O/P EST MOD 30 MIN: CPT | Mod: PBBFAC,25 | Performed by: NURSE PRACTITIONER

## 2024-11-15 PROCEDURE — 99999 PR PBB SHADOW E&M-EST. PATIENT-LVL IV: CPT | Mod: PBBFAC,,, | Performed by: NURSE PRACTITIONER

## 2024-11-15 PROCEDURE — 71260 CT THORAX DX C+: CPT | Mod: 26,,, | Performed by: RADIOLOGY

## 2024-11-15 PROCEDURE — 71260 CT THORAX DX C+: CPT | Mod: TC

## 2024-11-15 PROCEDURE — 25500020 PHARM REV CODE 255: Performed by: NURSE PRACTITIONER

## 2024-11-15 RX ORDER — IOPAMIDOL 755 MG/ML
100 INJECTION, SOLUTION INTRAVASCULAR
Status: COMPLETED | OUTPATIENT
Start: 2024-11-15 | End: 2024-11-15

## 2024-11-15 RX ORDER — BENAZEPRIL HYDROCHLORIDE AND HYDROCHLOROTHIAZIDE 20; 25 MG/1; MG/1
TABLET ORAL
COMMUNITY

## 2024-11-15 RX ADMIN — IOPAMIDOL 100 ML: 755 INJECTION, SOLUTION INTRAVENOUS at 09:11

## 2024-11-15 NOTE — PROGRESS NOTES
Subjective:       Patient ID: Zach Briscoe is a 54 y.o. male     Chief Complaint:    No chief complaint on file.       Allergies:  Patient has no known allergies.    Current Medications:    Outpatient Encounter Medications as of 11/15/2024   Medication Sig Dispense Refill    amLODIPine (NORVASC) 10 MG tablet Take 1 tablet (10 mg total) by mouth once daily. 90 tablet 3    aspirin (ECOTRIN) 81 MG EC tablet Take 81 mg by mouth once daily.      atorvastatin (LIPITOR) 40 MG tablet Take 1 tablet (40 mg total) by mouth once daily. 90 tablet 3    benazepriL (LOTENSIN) 20 MG tablet Take 1 tablet (20 mg total) by mouth once daily. 90 tablet 3    benazepril-hydrochlorthiazide (LOTENSIN HCT) 20-25 mg Tab Take by mouth.      FOLBEE PLUS 5 mg Tab Take 1 tablet by mouth.      folic acid-vit B6-vit B12 (FOLBEE) 2.5-25-1 mg Tab Take 1 tablet by mouth once daily.      mecobalamin, vitamin B12, 1,000 mcg Chew Take 1 tablet by mouth once daily.      multivitamin capsule Take 1 capsule by mouth once daily.      nitroGLYCERIN (NITROSTAT) 0.4 MG SL tablet Place 1 tablet (0.4 mg total) under the tongue every 5 (five) minutes as needed for Chest pain (x 3 doses. Please go to the ER if chest pain persits after 3rd dose of nitroglycerin). 30 tablet 0    vit b comp & c-fa-copper-zinc 5-1.5-25 mg (FOLBEE PLUS) 5-1.5-25 mg Tab Take 1 tablet by mouth once daily. 30 each 11     Facility-Administered Encounter Medications as of 11/15/2024   Medication Dose Route Frequency Provider Last Rate Last Admin    [COMPLETED] iopamidoL (ISOVUE-370) injection 100 mL  100 mL Intravenous ONCE PRN Topher Patel FNP   100 mL at 11/15/24 0915       History of Present Illness  55 y/o male following in neurology for history of CVA.  Last clinic visit a month ago.    Prior MRI brain done 5/3023 had shown a sub-acute CVA left temporal and left occipital lobes.  He was told to take his ASA, statin, and HTN medication as directed.  Had already been told  this in 2017 when prior followed by us but apparently had stopped it and not kept follow-up with us.  He then did not return to the clinic for 11 months, cancelling his scheduled follow-up with us.  Notes in the EMR indicated he had run out of his medications.  He was seen by us in March of 2024 with reported worsening in left arm weakness and speech difficulty for about 3 months, onset during the time he was out of all his medications.  I obtained another MRI brain 4/2/24, which questioned new CVA left parietal lobe.    He was seen by Dr. Chung in cardiology in late June of 2024, this was referral from us due to the CVA for evaluation to r/o cardiac related reason for CVA, though non-compliance with treatment plan is likely main source.    I see he was evaluated at [facility] 9/26/24 for reported blurring vision and bilateral ptosis.  It would appear they were planning surgery for this.  The note is scanned in media.  Had concern if symptoms were possibly an ocular myasthenia, there was no indication of any labs being done so I obtained acetylcholine receptor AB and musk.  His  AR came back slightly elevated at 0.07, musk was negative.  CT chest done here today, results still pending.  I recommend an evaluation with [facility] neuro-ophthalmology, would at least like their opinion on the presentation and can try him trial with mestinon in meantime.           Review of Systems  Review of Systems   Constitutional:  Negative for diaphoresis and fever.   HENT:  Negative for congestion, hearing loss and tinnitus.    Eyes:  Negative for blurred vision, double vision, photophobia, discharge and redness.   Respiratory:  Negative for cough and shortness of breath.    Cardiovascular:  Negative for chest pain.   Gastrointestinal:  Negative for abdominal pain, nausea and vomiting.   Musculoskeletal:  Negative for back pain, joint pain, myalgias and neck pain.   Skin:  Negative for itching and rash.   Neurological:  Positive for sensory  change, weakness and headaches. Negative for dizziness, tremors, speech change, focal weakness, seizures and loss of consciousness.   Psychiatric/Behavioral:  Positive for memory loss. Negative for depression and hallucinations. The patient does not have insomnia.    All other systems reviewed and are negative.     Objective:     NEUROLOGICAL EXAMINATION:     MENTAL STATUS   Oriented to person, place, and time.   Attention: normal. Concentration: normal.   Speech: speech is normal   Level of consciousness: alert  Knowledge: good and consistent with education.   Normal comprehension.     CRANIAL NERVES     CN II   Visual fields full to confrontation.   Visual acuity: normal  Right visual field deficit: none  Left visual field deficit: none     CN III, IV, VI   Pupils are equal, round, and reactive to light.  Extraocular motions are normal.   Right pupil: Size: 3 mm. Shape: regular. Reactivity: brisk. Consensual response: intact. Accommodation: intact.   Left pupil: Size: 3 mm. Shape: regular. Reactivity: brisk. Consensual response: intact. Accommodation: intact.   CN III: no CN III palsy  CN VI: no CN VI palsy  Nystagmus: bilateral   Nystagmus type: rapid and horizontal  Diplopia: none  Upgaze: normal  Downgaze: normal  Conjugate gaze: present  Vestibulo-ocular reflex: present    CN V   Facial sensation intact.   Right facial sensation deficit: none  Left facial sensation deficit: none  Right corneal reflex: normal  Left corneal reflex: normal    CN VII   Facial expression full, symmetric.   Right facial weakness: none  Left facial weakness: none  Right taste: normal  Left taste: normal    CN VIII   CN VIII normal.   Hearing: intact  Right Rinne: AC > BC  Left Rinne: AC > BC    CN IX, X   CN IX normal.   CN X normal.   Palate: symmetric    CN XI   CN XI normal.   Right sternocleidomastoid strength: normal  Left sternocleidomastoid strength: normal  Right trapezius strength: normal  Left trapezius strength:  normal    CN XII   CN XII normal.   Tongue: not atrophic  Fasciculations: absent  Tongue deviation: none       Nystagmus is long standing since birth     MOTOR EXAM   Muscle bulk: normal  Overall muscle tone: normal  Right arm tone: normal  Left arm tone: normal  Right arm pronator drift: absent  Left arm pronator drift: present  Right leg tone: normal  Left leg tone: normal    Strength   Right neck flexion: 5/5  Left neck flexion: 5/5  Right neck extension: 5/5  Left neck extension: 5/5  Right deltoid: 5/5  Left deltoid: 5/5  Right biceps: 5/5  Left biceps: 4/5  Right triceps: 5/5  Left triceps: 4/5  Right wrist flexion: 5/5  Left wrist flexion: 5/5  Right wrist extension: 5/5  Left wrist extension: 5/5  Right interossei: 5/5  Left interossei: 5/5  Right iliopsoas: 5/5  Left iliopsoas: 5/5  Right quadriceps: 5/5  Left quadriceps: 5/5  Right hamstrin/5  Left hamstrin/5  Right anterior tibial: 5/5  Left anterior tibial: 5/5  Right posterior tibial: 5/5  Left posterior tibial: 5/5  Right gastroc: 5/5  Left gastroc: 5/5    REFLEXES     Reflexes   Right brachioradialis: 2+  Left brachioradialis: 2+  Right biceps: 2+  Left biceps: 2+  Right triceps: 2+  Left triceps: 2+  Right patellar: 2+  Left patellar: 2+  Right achilles: 2+  Left achilles: 2+  Right plantar: normal  Left plantar: normal  Right Wong: absent  Left Wong: absent  Right ankle clonus: absent  Left ankle clonus: absent  Right pendular knee jerk: absent  Left pendular knee jerk: absent    SENSORY EXAM   Light touch normal.   Right arm light touch: normal  Left arm light touch: normal  Right leg light touch: normal  Left leg light touch: normal  Vibration normal.   Right arm vibration: normal  Left arm vibration: normal  Right leg vibration: normal  Left leg vibration: normal  Proprioception normal.   Right arm proprioception: normal  Left arm proprioception: normal  Right leg proprioception: normal  Left leg proprioception: normal  Pinprick  normal.   Right arm pinprick: normal  Left arm pinprick: normal  Right leg pinprick: normal  Left leg pinprick: normal  Graphesthesia: normal  Romberg: negative  Stereognosis: normal    GAIT AND COORDINATION     Gait  Gait: normal     Coordination   Finger to nose coordination: normal  Heel to shin coordination: normal  Tandem walking coordination: normal    Tremor   Resting tremor: absent  Intention tremor: absent  Action tremor: absent       Physical Exam  Vitals and nursing note reviewed.   Constitutional:       Appearance: Normal appearance.   HENT:      Head: Normocephalic.      Ears:      Right Rinne: AC > BC.     Left Rinne: AC > BC.  Eyes:      Extraocular Movements: EOM normal.      Pupils: Pupils are equal, round, and reactive to light.   Cardiovascular:      Rate and Rhythm: Normal rate and regular rhythm.      Pulses: Normal pulses.      Heart sounds: Normal heart sounds.   Pulmonary:      Effort: Pulmonary effort is normal.      Breath sounds: Normal breath sounds.   Musculoskeletal:         General: Normal range of motion.      Cervical back: Normal range of motion and neck supple.   Skin:     General: Skin is warm and dry.   Neurological:      General: No focal deficit present.      Mental Status: He is alert and oriented to person, place, and time.      Cranial Nerves: No cranial nerve deficit.      Sensory: Sensory deficit present.      Motor: Weakness present.      Coordination: Coordination normal. Finger-Nose-Finger Test, Heel to Shin Test and Romberg Test normal.      Gait: Gait is intact. Gait and tandem walk normal.      Deep Tendon Reflexes: Reflexes normal.      Reflex Scores:       Tricep reflexes are 2+ on the right side and 2+ on the left side.       Bicep reflexes are 2+ on the right side and 2+ on the left side.       Brachioradialis reflexes are 2+ on the right side and 2+ on the left side.       Patellar reflexes are 2+ on the right side and 2+ on the left side.       Achilles  reflexes are 2+ on the right side and 2+ on the left side.  Psychiatric:         Mood and Affect: Mood normal.         Speech: Speech normal.         Behavior: Behavior normal.          Assessment:     Problem List Items Addressed This Visit    None                 Primary Diagnosis and ICD10  No primary diagnosis found.    Plan:     There are no Patient Instructions on file for this visit.    There are no discontinued medications.      Requested Prescriptions      No prescriptions requested or ordered in this encounter       No orders of the defined types were placed in this encounter.

## 2024-11-19 ENCOUNTER — TELEPHONE (OUTPATIENT)
Dept: NEUROLOGY | Facility: CLINIC | Age: 54
End: 2024-11-19
Payer: COMMERCIAL

## 2024-11-19 NOTE — TELEPHONE ENCOUNTER
See other phoe encounter.        ----- Message from mEily sent at 11/19/2024  3:17 PM CST -----  Regarding: Referral  Who Called: Zach Briscoe    Patient would like an update on referral [facility] for optic Nerve      Preferred Method of Contact: Phone Call  Patient's Preferred Phone Number on File: [phone]   Best Call Back Number, if different:

## 2024-11-19 NOTE — TELEPHONE ENCOUNTER
Called pt's wife with results of CT chest and told her NOLA Patel NP wants to go ahead with referral to neuro-ophthalmology. She v/u.        ----- Message from DORETHA Bonilla sent at 11/19/2024  8:54 AM CST -----  CT chest negative, recommend stick to plan at his last follow-up and refer to [facility] neuro-ophthalmology given labs and symptoms to r/o ocular myasthenia

## 2024-11-20 ENCOUNTER — TELEPHONE (OUTPATIENT)
Dept: NEUROLOGY | Facility: CLINIC | Age: 54
End: 2024-11-20
Payer: COMMERCIAL

## 2024-11-20 NOTE — TELEPHONE ENCOUNTER
Returned call to pt's wife told her pt's appointment is on 11-27-24 at 8:00 at [facility]. She v/u.        ----- Message from Aisha sent at 11/20/2024  1:01 PM CST -----  Regarding: missed call  Who Called: Zach Briscoe's wife    Patient is returning phone call    Who Left Message for Patient: Nurse or MA of P Topher Blackwellin   Does the patient know what this is regarding?: Yes, [facility] referral      Preferred Method of Contact: Phone Call  Patient's Preferred Phone Number on File: [phone]     Additional Information: Pt.'s wife asking to get a call back to confirm appt. Times for referral and other inquiries.

## 2025-02-15 DIAGNOSIS — I10 HYPERTENSION, UNSPECIFIED TYPE: ICD-10-CM

## 2025-02-17 RX ORDER — AMLODIPINE BESYLATE 10 MG/1
10 TABLET ORAL
Qty: 90 TABLET | Refills: 0 | Status: SHIPPED | OUTPATIENT
Start: 2025-02-17

## 2025-03-28 DIAGNOSIS — E78.5 HYPERLIPIDEMIA, UNSPECIFIED HYPERLIPIDEMIA TYPE: ICD-10-CM

## 2025-03-28 RX ORDER — ATORVASTATIN CALCIUM 40 MG/1
40 TABLET, FILM COATED ORAL
Qty: 90 TABLET | Refills: 0 | Status: SHIPPED | OUTPATIENT
Start: 2025-03-28

## 2025-04-22 ENCOUNTER — HOSPITAL ENCOUNTER (EMERGENCY)
Facility: HOSPITAL | Age: 55
Discharge: HOME | End: 2025-04-22
Attending: FAMILY MEDICINE
Payer: COMMERCIAL

## 2025-04-22 VITALS
BODY MASS INDEX: 32.88 KG/M2 | HEIGHT: 69 IN | OXYGEN SATURATION: 99 % | RESPIRATION RATE: 18 BRPM | HEART RATE: 77 BPM | WEIGHT: 222 LBS | DIASTOLIC BLOOD PRESSURE: 86 MMHG | SYSTOLIC BLOOD PRESSURE: 159 MMHG | TEMPERATURE: 98 F

## 2025-04-22 DIAGNOSIS — R07.9 CHEST PAIN: Primary | ICD-10-CM

## 2025-04-22 DIAGNOSIS — R07.89 ATYPICAL CHEST PAIN: ICD-10-CM

## 2025-04-22 DIAGNOSIS — K29.70 GASTRITIS, PRESENCE OF BLEEDING UNSPECIFIED, UNSPECIFIED CHRONICITY, UNSPECIFIED GASTRITIS TYPE: ICD-10-CM

## 2025-04-22 LAB
ANION GAP SERPL CALCULATED.3IONS-SCNC: 10 MMOL/L (ref 7–16)
BASOPHILS # BLD AUTO: 0.01 K/UL (ref 0–0.2)
BASOPHILS NFR BLD AUTO: 0.2 % (ref 0–1)
BUN SERPL-MCNC: 17 MG/DL (ref 8–26)
BUN/CREAT SERPL: 14 (ref 6–20)
CALCIUM SERPL-MCNC: 10.4 MG/DL (ref 8.4–10.2)
CHLORIDE SERPL-SCNC: 108 MMOL/L (ref 98–107)
CO2 SERPL-SCNC: 26 MMOL/L (ref 22–29)
CREAT SERPL-MCNC: 1.21 MG/DL (ref 0.72–1.25)
DIFFERENTIAL METHOD BLD: ABNORMAL
EGFR (NO RACE VARIABLE) (RUSH/TITUS): 72 ML/MIN/1.73M2
EOSINOPHIL # BLD AUTO: 0.17 K/UL (ref 0–0.5)
EOSINOPHIL NFR BLD AUTO: 3.2 % (ref 1–4)
ERYTHROCYTE [DISTWIDTH] IN BLOOD BY AUTOMATED COUNT: 12.3 % (ref 11.5–14.5)
GLUCOSE SERPL-MCNC: 123 MG/DL (ref 74–100)
HCT VFR BLD AUTO: 40.6 % (ref 40–54)
HGB BLD-MCNC: 13.3 G/DL (ref 13.5–18)
IMM GRANULOCYTES # BLD AUTO: 0.01 K/UL (ref 0–0.04)
IMM GRANULOCYTES NFR BLD: 0.2 % (ref 0–0.4)
LYMPHOCYTES # BLD AUTO: 1.09 K/UL (ref 1–4.8)
LYMPHOCYTES NFR BLD AUTO: 20.5 % (ref 27–41)
MCH RBC QN AUTO: 30.2 PG (ref 27–31)
MCHC RBC AUTO-ENTMCNC: 32.8 G/DL (ref 32–36)
MCV RBC AUTO: 92.1 FL (ref 80–96)
MONOCYTES # BLD AUTO: 0.42 K/UL (ref 0–0.8)
MONOCYTES NFR BLD AUTO: 7.9 % (ref 2–6)
MPC BLD CALC-MCNC: 8.6 FL (ref 9.4–12.4)
NEUTROPHILS # BLD AUTO: 3.63 K/UL (ref 1.8–7.7)
NEUTROPHILS NFR BLD AUTO: 68 % (ref 53–65)
NRBC # BLD AUTO: 0 X10E3/UL
NRBC, AUTO (.00): 0 %
OHS QRS DURATION: 142 MS
OHS QTC CALCULATION: 454 MS
PLATELET # BLD AUTO: 260 K/UL (ref 150–400)
POTASSIUM SERPL-SCNC: 4.1 MMOL/L (ref 3.5–5.1)
RBC # BLD AUTO: 4.41 M/UL (ref 4.6–6.2)
SODIUM SERPL-SCNC: 140 MMOL/L (ref 136–145)
TROPONIN I SERPL HS-MCNC: <2.7 NG/L
TROPONIN I SERPL HS-MCNC: <2.7 NG/L
WBC # BLD AUTO: 5.33 K/UL (ref 4.5–11)

## 2025-04-22 PROCEDURE — 36415 COLL VENOUS BLD VENIPUNCTURE: CPT | Performed by: EMERGENCY MEDICINE

## 2025-04-22 PROCEDURE — 85025 COMPLETE CBC W/AUTO DIFF WBC: CPT | Performed by: EMERGENCY MEDICINE

## 2025-04-22 PROCEDURE — 36415 COLL VENOUS BLD VENIPUNCTURE: CPT | Performed by: FAMILY MEDICINE

## 2025-04-22 PROCEDURE — 99285 EMERGENCY DEPT VISIT HI MDM: CPT | Mod: 25

## 2025-04-22 PROCEDURE — 84484 ASSAY OF TROPONIN QUANT: CPT | Performed by: EMERGENCY MEDICINE

## 2025-04-22 PROCEDURE — 93005 ELECTROCARDIOGRAM TRACING: CPT

## 2025-04-22 PROCEDURE — 80048 BASIC METABOLIC PNL TOTAL CA: CPT | Performed by: EMERGENCY MEDICINE

## 2025-04-22 PROCEDURE — 84484 ASSAY OF TROPONIN QUANT: CPT | Performed by: FAMILY MEDICINE

## 2025-04-22 PROCEDURE — 25000003 PHARM REV CODE 250: Performed by: FAMILY MEDICINE

## 2025-04-22 RX ORDER — LIDOCAINE HYDROCHLORIDE 20 MG/ML
10 SOLUTION OROPHARYNGEAL
Status: COMPLETED | OUTPATIENT
Start: 2025-04-22 | End: 2025-04-22

## 2025-04-22 RX ORDER — TIZANIDINE 4 MG/1
4 TABLET ORAL EVERY 6 HOURS PRN
Qty: 30 TABLET | Refills: 0 | Status: SHIPPED | OUTPATIENT
Start: 2025-04-22 | End: 2025-05-02

## 2025-04-22 RX ORDER — OMEPRAZOLE 20 MG/1
20 CAPSULE, DELAYED RELEASE ORAL DAILY
Qty: 40 CAPSULE | Refills: 2 | Status: SHIPPED | OUTPATIENT
Start: 2025-04-22 | End: 2026-04-22

## 2025-04-22 RX ORDER — ALUMINUM HYDROXIDE, MAGNESIUM HYDROXIDE, AND SIMETHICONE 1200; 120; 1200 MG/30ML; MG/30ML; MG/30ML
30 SUSPENSION ORAL
Status: COMPLETED | OUTPATIENT
Start: 2025-04-22 | End: 2025-04-22

## 2025-04-22 RX ADMIN — LIDOCAINE HYDROCHLORIDE 10 ML: 20 SOLUTION ORAL at 10:04

## 2025-04-22 RX ADMIN — ALUMINUM HYDROXIDE, MAGNESIUM HYDROXIDE, AND SIMETHICONE 30 ML: 200; 200; 20 SUSPENSION ORAL at 10:04

## 2025-04-22 NOTE — Clinical Note
"Zach Footemookie Briscoe was seen and treated in our emergency department on 4/22/2025.  He may return to work on 04/25/2025.       If you have any questions or concerns, please don't hesitate to call.      Anirudh Hoover, DO"

## 2025-04-22 NOTE — ED NOTES
ED leader rounding done at this time.Updated on status of reevaluation.No needs voiced at this time.

## 2025-04-23 ENCOUNTER — TELEPHONE (OUTPATIENT)
Dept: EMERGENCY MEDICINE | Facility: HOSPITAL | Age: 55
End: 2025-04-23
Payer: COMMERCIAL

## 2025-04-28 NOTE — Clinical Note
"Zach"Brodie Briscoe was seen and treated in our emergency department on 2/22/2023.  He may return to work on 02/23/2023.  See Dr Alvarenga today 2/22/2023      If you have any questions or concerns, please don't hesitate to call.      Dr Rufino Haider RN    " no

## 2025-05-14 NOTE — ED PROVIDER NOTES
Encounter Date: 4/22/2025       History     Chief Complaint   Patient presents with    Chest Pain     Patient comes in with complaints of chest pain no shortness a breath.  No nausea vomiting diarrhea hurts to take a deep breath        Review of patient's allergies indicates:  No Known Allergies  Past Medical History:   Diagnosis Date    Hypertension      History reviewed. No pertinent surgical history.  Family History   Problem Relation Name Age of Onset    Hypertension Mother      Diabetes Mother      Cancer Father       Social History[1]  Review of Systems   Constitutional:  Positive for fatigue. Negative for fever.   HENT: Negative.  Negative for sore throat.    Eyes: Negative.    Respiratory: Negative.  Negative for shortness of breath.    Cardiovascular: Negative.  Negative for chest pain.   Gastrointestinal: Negative.  Negative for nausea.   Endocrine: Negative.    Genitourinary: Negative.  Negative for dysuria.   Musculoskeletal: Negative.  Negative for back pain.   Skin: Negative.  Negative for rash.   Allergic/Immunologic: Negative.    Neurological: Negative.  Negative for weakness.   Hematological: Negative.  Does not bruise/bleed easily.   Psychiatric/Behavioral: Negative.         Physical Exam     Initial Vitals   BP Pulse Resp Temp SpO2   04/22/25 0715 04/22/25 0712 04/22/25 0712 04/22/25 0712 04/22/25 0712   (!) 159/86 77 18 97.7 °F (36.5 °C) 99 %      MAP       --                Physical Exam    Constitutional: He appears well-developed and well-nourished.   HENT:   Head: Normocephalic and atraumatic.   Right Ear: External ear normal.   Left Ear: External ear normal.   Nose: Nose normal. Mouth/Throat: Oropharynx is clear and moist.   Eyes: Conjunctivae and EOM are normal. Pupils are equal, round, and reactive to light.   Neck: Neck supple.   Normal range of motion.  Cardiovascular:  Normal rate, regular rhythm, normal heart sounds and intact distal pulses.           Pulmonary/Chest: Breath sounds  normal.   Abdominal: Abdomen is soft. Bowel sounds are normal.   Genitourinary:    Prostate and penis normal.     Musculoskeletal:         General: Normal range of motion.      Cervical back: Normal range of motion and neck supple.     Neurological: He is alert and oriented to person, place, and time. He has normal strength and normal reflexes.   Skin: Skin is warm and dry.   Psychiatric: He has a normal mood and affect. His behavior is normal. Judgment and thought content normal.         Medical Screening Exam   See Full Note    ED Course   Procedures  Labs Reviewed   BASIC METABOLIC PANEL - Abnormal       Result Value    Sodium 140      Potassium 4.1      Chloride 108 (*)     CO2 26      Anion Gap 10      Glucose 123 (*)     BUN 17      Creatinine 1.21      BUN/Creatinine Ratio 14      Calcium 10.4 (*)     eGFR 72     CBC WITH DIFFERENTIAL - Abnormal    WBC 5.33      RBC 4.41 (*)     Hemoglobin 13.3 (*)     Hematocrit 40.6      MCV 92.1      MCH 30.2      MCHC 32.8      RDW 12.3      Platelet Count 260      MPV 8.6 (*)     Neutrophils % 68.0 (*)     Lymphocytes % 20.5 (*)     Monocytes % 7.9 (*)     Eosinophils % 3.2      Basophils % 0.2      Immature Granulocytes % 0.2      nRBC, Auto 0.0      Neutrophils, Abs 3.63      Lymphocytes, Absolute 1.09      Monocytes, Absolute 0.42      Eosinophils, Absolute 0.17      Basophils, Absolute 0.01      Immature Granulocytes, Absolute 0.01      nRBC, Absolute 0.00      Diff Type Auto     TROPONIN I - Normal    Troponin I High Sensitivity <2.7     TROPONIN I - Normal    Troponin I High Sensitivity <2.7     CBC W/ AUTO DIFFERENTIAL    Narrative:     The following orders were created for panel order CBC auto differential.  Procedure                               Abnormality         Status                     ---------                               -----------         ------                     CBC with Differential[[id number]]       Abnormal            Final result                  Please view results for these tests on the individual orders.        ECG Results              EKG 12-lead (Final result)        Collection Time Result Time QRS Duration OHS QTC Calculation    04/22/25 07:16:09 04/22/25 11:50:39 142 454                     Final result by Interface, Lab In [facility] (04/22/25 11:50:45)                   Narrative:    Test Reason : R07.9,    Vent. Rate :  77 BPM     Atrial Rate :  77 BPM     P-R Int : 146 ms          QRS Dur : 142 ms      QT Int : 402 ms       P-R-T Axes :  73 -84  41 degrees    QTcB Int : 454 ms    Normal sinus rhythm  Right bundle branch block  Left anterior fascicular block   Bifascicular block   Abnormal ECG  No previous ECGs available  Confirmed by Jose F Brady ([id number]) on 4/22/2025 11:50:34 AM    Referred By: ELIFREFERRAL SELF           Confirmed By: Rehmat Caitlyn                                  Imaging Results              X-Ray Chest AP Portable (Final result)  Result time 04/22/25 08:38:30      Final result by Gera Krishnan Jr., MD (04/22/25 08:38:30)                   Impression:      No acute findings.      Electronically signed by: Gera Krishnan  Date:    04/22/2025  Time:    08:38               Narrative:    EXAMINATION:  XR CHEST AP PORTABLE    CLINICAL HISTORY:  Chest pain, unspecified    TECHNIQUE:  Singleview of the chest was performed.    COMPARISON:  February 22, 2023    FINDINGS:  Lines and tubes: None.    Cardiac size is within normal limits.The lungs are clear.  No pneumothorax.No pleural effusions. No acute bony abnormality                                       Medications   aluminum-magnesium hydroxide-simethicone 200-200-20 mg/5 mL suspension 30 mL (30 mLs Oral Given 4/22/25 1020)   LIDOcaine viscous HCl 2% oral solution 10 mL (10 mLs Oral Given 4/22/25 1020)     Medical Decision Making  Risk  OTC drugs.  Prescription drug management.                          Medical Decision Making:   Differential Diagnosis:   Costochondritis  ED  Management:  Follow-up primary care provider             Clinical Impression:   Final diagnoses:  [R07.9] Chest pain (Primary)  [R07.89] Atypical chest pain  [K29.70] Gastritis, presence of bleeding unspecified, unspecified chronicity, unspecified gastritis type        ED Disposition Condition    Discharge Good          ED Prescriptions       Medication Sig Dispense Start Date End Date Auth. Provider    tiZANidine (ZANAFLEX) 4 MG tablet () Take 1 tablet (4 mg total) by mouth every 6 (six) hours as needed. 30 tablet 2025 Anirudh Hoover DO    omeprazole (PRILOSEC) 20 MG capsule Take 1 capsule (20 mg total) by mouth once daily. 40 capsule 2025 Anirudh Hoover DO          Follow-up Information    None            [1]   Social History  Tobacco Use    Smoking status: Never     Passive exposure: Never    Smokeless tobacco: Never   Substance Use Topics    Alcohol use: Yes     Comment: socially    Drug use: Never        Anirudh Hoover DO  25 [id number]

## 2025-05-26 DIAGNOSIS — I10 HYPERTENSION, UNSPECIFIED TYPE: ICD-10-CM

## 2025-05-27 RX ORDER — AMLODIPINE BESYLATE 10 MG/1
10 TABLET ORAL
Qty: 90 TABLET | Refills: 0 | OUTPATIENT
Start: 2025-05-27

## 2025-06-03 ENCOUNTER — HOSPITAL ENCOUNTER (OUTPATIENT)
Dept: CARDIOLOGY | Facility: HOSPITAL | Age: 55
Discharge: HOME | End: 2025-06-03
Attending: NURSE PRACTITIONER
Payer: COMMERCIAL

## 2025-06-03 ENCOUNTER — OFFICE VISIT (OUTPATIENT)
Dept: CARDIOLOGY | Facility: CLINIC | Age: 55
End: 2025-06-03
Payer: COMMERCIAL

## 2025-06-03 VITALS
HEART RATE: 65 BPM | SYSTOLIC BLOOD PRESSURE: 142 MMHG | WEIGHT: 218 LBS | BODY MASS INDEX: 32.29 KG/M2 | HEIGHT: 69 IN | DIASTOLIC BLOOD PRESSURE: 84 MMHG | OXYGEN SATURATION: 98 %

## 2025-06-03 DIAGNOSIS — I63.9 CEREBROVASCULAR ACCIDENT (CVA), UNSPECIFIED MECHANISM: ICD-10-CM

## 2025-06-03 DIAGNOSIS — R94.39 ABNORMAL STRESS TEST: ICD-10-CM

## 2025-06-03 DIAGNOSIS — I10 PRIMARY HYPERTENSION: ICD-10-CM

## 2025-06-03 DIAGNOSIS — R07.9 CHEST PAIN, UNSPECIFIED TYPE: ICD-10-CM

## 2025-06-03 DIAGNOSIS — G45.9 TIA (TRANSIENT ISCHEMIC ATTACK): ICD-10-CM

## 2025-06-03 DIAGNOSIS — R07.9 CHEST PAIN, UNSPECIFIED TYPE: Primary | ICD-10-CM

## 2025-06-03 DIAGNOSIS — R94.39 ABNORMAL CARDIOVASCULAR STRESS TEST: ICD-10-CM

## 2025-06-03 PROCEDURE — 4010F ACE/ARB THERAPY RXD/TAKEN: CPT | Mod: ,,, | Performed by: NURSE PRACTITIONER

## 2025-06-03 PROCEDURE — 99214 OFFICE O/P EST MOD 30 MIN: CPT | Mod: S$PBB,,, | Performed by: NURSE PRACTITIONER

## 2025-06-03 PROCEDURE — 99215 OFFICE O/P EST HI 40 MIN: CPT | Mod: PBBFAC,25 | Performed by: NURSE PRACTITIONER

## 2025-06-03 PROCEDURE — 3077F SYST BP >= 140 MM HG: CPT | Mod: ,,, | Performed by: NURSE PRACTITIONER

## 2025-06-03 PROCEDURE — 99999 PR PBB SHADOW E&M-EST. PATIENT-LVL V: CPT | Mod: PBBFAC,,, | Performed by: NURSE PRACTITIONER

## 2025-06-03 PROCEDURE — 3079F DIAST BP 80-89 MM HG: CPT | Mod: ,,, | Performed by: NURSE PRACTITIONER

## 2025-06-03 PROCEDURE — 1159F MED LIST DOCD IN RCRD: CPT | Mod: ,,, | Performed by: NURSE PRACTITIONER

## 2025-06-03 PROCEDURE — 3008F BODY MASS INDEX DOCD: CPT | Mod: ,,, | Performed by: NURSE PRACTITIONER

## 2025-06-03 PROCEDURE — 93246 EXT ECG>7D<15D RECORDING: CPT

## 2025-06-03 RX ORDER — HYDRALAZINE HYDROCHLORIDE 25 MG/1
25 TABLET, FILM COATED ORAL EVERY 12 HOURS
Qty: 180 TABLET | Refills: 3 | Status: SHIPPED | OUTPATIENT
Start: 2025-06-03 | End: 2026-06-03

## 2025-06-05 PROBLEM — R94.39 ABNORMAL CARDIOVASCULAR STRESS TEST: Status: ACTIVE | Noted: 2025-06-05

## 2025-06-25 ENCOUNTER — TELEPHONE (OUTPATIENT)
Dept: CARDIOLOGY | Facility: CLINIC | Age: 55
End: 2025-06-25
Payer: COMMERCIAL

## 2025-06-25 NOTE — TELEPHONE ENCOUNTER
Spoke with pt's wife and informed her that the referral was put in but looks like it has not been worked yet. Reached out to referral center to find out what's going on and will let pt know once I know something.

## 2025-06-30 ENCOUNTER — TELEPHONE (OUTPATIENT)
Dept: CARDIOLOGY | Facility: CLINIC | Age: 55
End: 2025-06-30
Payer: COMMERCIAL

## 2025-06-30 NOTE — TELEPHONE ENCOUNTER
Spoke with pt and informed him procedure has been approved and info was faxed over today. Informed him it may take a week or so for [facility] to call. Pt v/u.

## 2025-06-30 NOTE — TELEPHONE ENCOUNTER
----- Message from Pastora sent at 6/25/2025  9:32 AM CDT -----  Regarding: RE: CTA  Good morning, Ms. Carvalho.    We'll take care of it. Thanks.    Pastora Rose  Pre-  ----- Message -----  From: Maia Quintero MA  Sent: 6/25/2025   9:16 AM CDT  To: Pastora Rose  Subject: CTA                                              Good morning. Is there any way you can help me. This patient has a referral put in for CTA for [place]. Can you tell me if it has been worked? Thanks.

## 2025-07-07 ENCOUNTER — RESULTS FOLLOW-UP (OUTPATIENT)
Dept: CARDIOLOGY | Facility: CLINIC | Age: 55
End: 2025-07-07

## 2025-07-07 DIAGNOSIS — I10 HYPERTENSION, UNSPECIFIED TYPE: ICD-10-CM

## 2025-07-07 DIAGNOSIS — E53.8 VITAMIN B12 DEFICIENCY: ICD-10-CM

## 2025-07-07 RX ORDER — AMLODIPINE BESYLATE 10 MG/1
10 TABLET ORAL DAILY
Qty: 90 TABLET | Refills: 0 | Status: SHIPPED | OUTPATIENT
Start: 2025-07-07

## 2025-07-07 RX ORDER — FOLIC ACID/VIT BCOMP,C/CU/ZINC 5-1.5-25MG
1 TABLET ORAL DAILY
Qty: 30 EACH | Refills: 5 | Status: SHIPPED | OUTPATIENT
Start: 2025-07-07

## 2025-07-15 ENCOUNTER — TELEPHONE (OUTPATIENT)
Dept: CARDIOLOGY | Facility: CLINIC | Age: 55
End: 2025-07-15
Payer: COMMERCIAL

## 2025-07-23 RX ORDER — TIZANIDINE 4 MG/1
4 TABLET ORAL EVERY 6 HOURS PRN
Qty: 30 TABLET | Refills: 0 | Status: SHIPPED | OUTPATIENT
Start: 2025-07-23

## 2025-07-30 ENCOUNTER — TELEPHONE (OUTPATIENT)
Dept: CARDIOLOGY | Facility: CLINIC | Age: 55
End: 2025-07-30
Payer: COMMERCIAL

## 2025-07-30 ENCOUNTER — PATIENT MESSAGE (OUTPATIENT)
Dept: NEUROLOGY | Facility: CLINIC | Age: 55
End: 2025-07-30
Payer: COMMERCIAL

## 2025-07-30 NOTE — TELEPHONE ENCOUNTER
Called [facility] for pt's CTA referral order that was faxed. [facility] states they have the referral order they just have to call pt to set up time and day. Called pt and informed him that they should be calling him regarding this. Pt v/u.

## 2025-08-29 RX ORDER — BENAZEPRIL HYDROCHLORIDE 20 MG/1
20 TABLET ORAL DAILY
Qty: 90 TABLET | Refills: 3 | Status: SHIPPED | OUTPATIENT
Start: 2025-08-29